# Patient Record
Sex: MALE | Race: WHITE | NOT HISPANIC OR LATINO | Employment: FULL TIME | URBAN - NONMETROPOLITAN AREA
[De-identification: names, ages, dates, MRNs, and addresses within clinical notes are randomized per-mention and may not be internally consistent; named-entity substitution may affect disease eponyms.]

---

## 2024-06-17 ENCOUNTER — APPOINTMENT (OUTPATIENT)
Dept: RADIOLOGY | Facility: HOSPITAL | Age: 59
End: 2024-06-17
Payer: COMMERCIAL

## 2024-06-17 ENCOUNTER — HOSPITAL ENCOUNTER (EMERGENCY)
Facility: HOSPITAL | Age: 59
Discharge: HOME | End: 2024-06-18
Attending: EMERGENCY MEDICINE
Payer: COMMERCIAL

## 2024-06-17 ENCOUNTER — APPOINTMENT (OUTPATIENT)
Dept: CARDIOLOGY | Facility: HOSPITAL | Age: 59
End: 2024-06-17
Payer: COMMERCIAL

## 2024-06-17 DIAGNOSIS — M79.10 MYALGIA: Primary | ICD-10-CM

## 2024-06-17 DIAGNOSIS — J06.9 UPPER RESPIRATORY TRACT INFECTION, UNSPECIFIED TYPE: ICD-10-CM

## 2024-06-17 DIAGNOSIS — J01.90 ACUTE SINUSITIS, RECURRENCE NOT SPECIFIED, UNSPECIFIED LOCATION: ICD-10-CM

## 2024-06-17 DIAGNOSIS — E87.6 HYPOKALEMIA: ICD-10-CM

## 2024-06-17 LAB
ALBUMIN SERPL BCP-MCNC: 3.9 G/DL (ref 3.4–5)
ALP SERPL-CCNC: 76 U/L (ref 33–136)
ALT SERPL W P-5'-P-CCNC: 19 U/L (ref 10–52)
ANION GAP SERPL CALC-SCNC: 12 MMOL/L (ref 10–20)
AST SERPL W P-5'-P-CCNC: 14 U/L (ref 9–39)
BASOPHILS # BLD AUTO: 0.08 X10*3/UL (ref 0–0.1)
BASOPHILS NFR BLD AUTO: 0.5 %
BILIRUB SERPL-MCNC: 0.4 MG/DL (ref 0–1.2)
BUN SERPL-MCNC: 24 MG/DL (ref 6–23)
CALCIUM SERPL-MCNC: 9 MG/DL (ref 8.6–10.3)
CARDIAC TROPONIN I PNL SERPL HS: <3 NG/L (ref 0–20)
CHLORIDE SERPL-SCNC: 104 MMOL/L (ref 98–107)
CO2 SERPL-SCNC: 26 MMOL/L (ref 21–32)
CREAT SERPL-MCNC: 1.37 MG/DL (ref 0.5–1.3)
D DIMER PPP FEU-MCNC: <215 NG/ML FEU
EGFRCR SERPLBLD CKD-EPI 2021: 59 ML/MIN/1.73M*2
EOSINOPHIL # BLD AUTO: 0.27 X10*3/UL (ref 0–0.7)
EOSINOPHIL NFR BLD AUTO: 1.5 %
ERYTHROCYTE [DISTWIDTH] IN BLOOD BY AUTOMATED COUNT: 13.2 % (ref 11.5–14.5)
GLUCOSE SERPL-MCNC: 96 MG/DL (ref 74–99)
HCT VFR BLD AUTO: 48.5 % (ref 41–52)
HGB BLD-MCNC: 16.7 G/DL (ref 13.5–17.5)
IMM GRANULOCYTES # BLD AUTO: 0.06 X10*3/UL (ref 0–0.7)
IMM GRANULOCYTES NFR BLD AUTO: 0.3 % (ref 0–0.9)
LIPASE SERPL-CCNC: 28 U/L (ref 9–82)
LYMPHOCYTES # BLD AUTO: 5.19 X10*3/UL (ref 1.2–4.8)
LYMPHOCYTES NFR BLD AUTO: 29.5 %
MAGNESIUM SERPL-MCNC: 1.91 MG/DL (ref 1.6–2.4)
MCH RBC QN AUTO: 31 PG (ref 26–34)
MCHC RBC AUTO-ENTMCNC: 34.4 G/DL (ref 32–36)
MCV RBC AUTO: 90 FL (ref 80–100)
MONOCYTES # BLD AUTO: 1.87 X10*3/UL (ref 0.1–1)
MONOCYTES NFR BLD AUTO: 10.6 %
NEUTROPHILS # BLD AUTO: 10.14 X10*3/UL (ref 1.2–7.7)
NEUTROPHILS NFR BLD AUTO: 57.6 %
NRBC BLD-RTO: 0 /100 WBCS (ref 0–0)
PLATELET # BLD AUTO: 240 X10*3/UL (ref 150–450)
POTASSIUM SERPL-SCNC: 3.2 MMOL/L (ref 3.5–5.3)
PROT SERPL-MCNC: 6.3 G/DL (ref 6.4–8.2)
RBC # BLD AUTO: 5.39 X10*6/UL (ref 4.5–5.9)
SARS-COV-2 RNA RESP QL NAA+PROBE: NOT DETECTED
SODIUM SERPL-SCNC: 139 MMOL/L (ref 136–145)
WBC # BLD AUTO: 17.6 X10*3/UL (ref 4.4–11.3)

## 2024-06-17 PROCEDURE — 96374 THER/PROPH/DIAG INJ IV PUSH: CPT

## 2024-06-17 PROCEDURE — 93005 ELECTROCARDIOGRAM TRACING: CPT

## 2024-06-17 PROCEDURE — 99285 EMERGENCY DEPT VISIT HI MDM: CPT | Mod: 25

## 2024-06-17 PROCEDURE — 84484 ASSAY OF TROPONIN QUANT: CPT | Performed by: EMERGENCY MEDICINE

## 2024-06-17 PROCEDURE — 87635 SARS-COV-2 COVID-19 AMP PRB: CPT | Performed by: EMERGENCY MEDICINE

## 2024-06-17 PROCEDURE — 85025 COMPLETE CBC W/AUTO DIFF WBC: CPT | Performed by: EMERGENCY MEDICINE

## 2024-06-17 PROCEDURE — 80053 COMPREHEN METABOLIC PANEL: CPT | Performed by: EMERGENCY MEDICINE

## 2024-06-17 PROCEDURE — 74177 CT ABD & PELVIS W/CONTRAST: CPT | Performed by: RADIOLOGY

## 2024-06-17 PROCEDURE — 36415 COLL VENOUS BLD VENIPUNCTURE: CPT | Performed by: EMERGENCY MEDICINE

## 2024-06-17 PROCEDURE — 2550000001 HC RX 255 CONTRASTS: Performed by: EMERGENCY MEDICINE

## 2024-06-17 PROCEDURE — 83735 ASSAY OF MAGNESIUM: CPT | Performed by: EMERGENCY MEDICINE

## 2024-06-17 PROCEDURE — 2500000002 HC RX 250 W HCPCS SELF ADMINISTERED DRUGS (ALT 637 FOR MEDICARE OP, ALT 636 FOR OP/ED): Performed by: EMERGENCY MEDICINE

## 2024-06-17 PROCEDURE — 85379 FIBRIN DEGRADATION QUANT: CPT | Performed by: EMERGENCY MEDICINE

## 2024-06-17 PROCEDURE — 83690 ASSAY OF LIPASE: CPT | Performed by: EMERGENCY MEDICINE

## 2024-06-17 PROCEDURE — 74177 CT ABD & PELVIS W/CONTRAST: CPT

## 2024-06-17 PROCEDURE — 71260 CT THORAX DX C+: CPT | Performed by: RADIOLOGY

## 2024-06-17 PROCEDURE — 2500000004 HC RX 250 GENERAL PHARMACY W/ HCPCS (ALT 636 FOR OP/ED): Performed by: EMERGENCY MEDICINE

## 2024-06-17 PROCEDURE — 2500000001 HC RX 250 WO HCPCS SELF ADMINISTERED DRUGS (ALT 637 FOR MEDICARE OP): Performed by: EMERGENCY MEDICINE

## 2024-06-17 RX ORDER — POTASSIUM CHLORIDE 20 MEQ/1
20 TABLET, EXTENDED RELEASE ORAL 2 TIMES DAILY
Qty: 10 TABLET | Refills: 0 | Status: SHIPPED | OUTPATIENT
Start: 2024-06-17 | End: 2024-06-22

## 2024-06-17 RX ORDER — POTASSIUM CHLORIDE 20 MEQ/1
40 TABLET, EXTENDED RELEASE ORAL ONCE
Status: COMPLETED | OUTPATIENT
Start: 2024-06-17 | End: 2024-06-17

## 2024-06-17 RX ORDER — DOXYCYCLINE HYCLATE 50 MG/1
100 CAPSULE ORAL ONCE
Status: COMPLETED | OUTPATIENT
Start: 2024-06-17 | End: 2024-06-17

## 2024-06-17 RX ORDER — DOXYCYCLINE 100 MG/1
100 TABLET ORAL 2 TIMES DAILY
Qty: 20 TABLET | Refills: 0 | Status: SHIPPED | OUTPATIENT
Start: 2024-06-17 | End: 2024-06-27

## 2024-06-17 RX ORDER — KETOROLAC TROMETHAMINE 15 MG/ML
15 INJECTION, SOLUTION INTRAMUSCULAR; INTRAVENOUS ONCE
Status: COMPLETED | OUTPATIENT
Start: 2024-06-17 | End: 2024-06-17

## 2024-06-17 ASSESSMENT — PAIN - FUNCTIONAL ASSESSMENT: PAIN_FUNCTIONAL_ASSESSMENT: 0-10

## 2024-06-17 ASSESSMENT — PAIN SCALES - GENERAL
PAINLEVEL_OUTOF10: 6
PAINLEVEL_OUTOF10: 0 - NO PAIN

## 2024-06-18 VITALS
HEART RATE: 84 BPM | HEIGHT: 69 IN | OXYGEN SATURATION: 97 % | TEMPERATURE: 98 F | DIASTOLIC BLOOD PRESSURE: 57 MMHG | WEIGHT: 155 LBS | SYSTOLIC BLOOD PRESSURE: 99 MMHG | BODY MASS INDEX: 22.96 KG/M2 | RESPIRATION RATE: 17 BRPM

## 2024-06-18 ASSESSMENT — PAIN - FUNCTIONAL ASSESSMENT: PAIN_FUNCTIONAL_ASSESSMENT: 0-10

## 2024-06-18 ASSESSMENT — PAIN SCALES - GENERAL: PAINLEVEL_OUTOF10: 0 - NO PAIN

## 2024-06-18 NOTE — PROGRESS NOTES
Emergency Medicine Transition of Care Note.    I received Brennen Reyes in signout from Dr. KENDALL Sherwood.  Please see the previous ED provider note for all HPI, PE and MDM up to the time of signout at 2300. This is in addition to the primary record.    In brief Brennen Reyes is an 59 y.o. male presenting for   Chief Complaint   Patient presents with    Nasal Congestion     Cough , congestion , general body aches      At the time of signout we were awaiting: CT imaging chest/abd/pelvis    See  Dr Sherwood provider note:  HPI  Patient is a 59-year-old male presenting to the ED today for generalized bodyaches, cough, congestion, shortness of breath, abdominal pain, diarrhea.  Patient states that his symptoms have been ongoing for 1 month.  He does have a history of IBS, so the diarrhea is not unusual for him.  He went to urgent care last week and was prescribed a Z-Mihir and prednisone.  He states that his symptoms improved significantly on these medications, but he finished them and his symptoms returned.  He does not currently have a PCP as his insurance recently switched from [facility] to .  He denies any fevers at home.  He does not normally wear supplemental oxygen.  He has no additional concerns.    Medical Decision Making  Patient was seen and evaluated for 1 month of generalized muscle aching, cough, congestion, shortness of breath, abdominal pain, diarrhea.  On arrival, vital signs are stable.  Patient is oxygenating well on room air.  Peripheral IV is established and patient is started on a 1 L bolus of normal saline.  He is administered Toradol 15 mg IV for his pain.  Additional labs and imaging are ordered for further evaluation of his symptoms.     CBC shows leukocytosis with WBC of 17.6.  However, patient just recently finished his course of prednisone, which could be contributing to his leukocytosis.  CBC is otherwise unremarkable.  CMP shows mild hypokalemia with potassium of 3.2.  This is replaced with 40 mill  equivalents KCl orally.  Creatinine is slightly elevated at 1.37.  I have no previous lab work to compare to.  D-dimer is negative.  High-sensitivity troponin is negative.  Magnesium is normal at 1.9.  Lipase is normal at 28.     At this time, CT imaging remains pending.  Patient will be signed out to the oncoming physician, Dr. Diaz, pending the results of his imaging and further disposition.      ED Course as of 06/17/24 2344   Mon Jun 17, 2024 2215 EKG obtained at 2210, interpreted by myself.  Normal sinus rhythm with a ventricular rate of 65, no axis deviation, normal intervals, with no acute ischemic changes [VT]   2255 LEUKOCYTES (10*3/UL) IN BLOOD BY AUTOMATED COUNT, SHIELA(!): 17.6 [EC]   2326 Troponin is normal at less than 3  D-dimer is normal less than 215  Coronavirus is not detected  Magnesium normal at 1.91    Low potassium 3.2 was previously treated with oral KCl 40 mEq [EC]   2328 White count 17.6, hemoglobin 16.7 hematocrit 40.5, platelet count of 40,000, neutrophils 10.14  Patient recently completed prednisone  [EC]   2331 CT of the chest abdomen and pelvis  FINDINGS:  CHEST:      No pneumothorax or pleural effusion. Slight bronchial thickening. No  localizing infiltrate.      No pericardial effusion. No adenopathy ascending aorta measures 3.2  cm.      No central airway lesion.      Small sliding hiatal hernia no suspicious osseous lesions or acute  osseous abnormality.      ABDOMEN:      Congenital malrotation is noted without obstruction. Slightly  thick-walled appearance of the stomach. This may reflect mild  gastritis. Mucosal lesions not excluded by imaging.      Mild hepatic steatosis. Unremarkable adrenal glands pancreas and  spleen. 1 cm benign-appearing left renal cyst identified.      Moderate vascular calcification. There is no aneurysm.      The appendix has been removed. Fairly profound diverticulosis without  diverticulitis. Correlation with any known colonoscopy results.  No  free air. No free fluid.      Prostate gland is mildly enlarged. It deforms the bladder base. No  suspicious osseous lesions.      IMPRESSION:  No acute cardiopulmonary process detected. No evidence of pneumonia      Congenital malrotation without obstruction. Slightly thick-walled  appearance of the stomach which may reflect some mild gastritis  versus poor distention.      Appendectomy changes. Moderate constipation. There is fairly profound  diverticulosis of the sigmoid without diverticulitis. Correlation  with any known colonoscopy results     [EC]      ED Course User Index  [EC] Jacob Diaz DO  [VT] Heavenly ARGUETA MD         Diagnoses as of 06/17/24 2344   Myalgia   Hypokalemia   Upper respiratory tract infection, unspecified type   Acute sinusitis, recurrence not specified, unspecified location       Medical Decision Making  On reevaluation patient is resting comfortably.  Patient was discharged home in improved and stable condition.  I will start the patient on doxycycline 100 mg twice daily x 10 days for URI/sinusitis  Patient is encouraged to take Tylenol as needed for fever aches and pain.  Rx KCl 20 mEq 1 p.o. twice daily x 5 days for hypokalemia.  Patient given referral to follow-up with primary care and was given a provider list to choose a primary care provider locally.  Patient is invited to return if acutely worsening or worrisome symptoms.  Written and verbal instructions were given patient verbalized understanding of these.        Final diagnoses:   [M79.10] Myalgia   [E87.6] Hypokalemia   [J06.9] Upper respiratory tract infection, unspecified type   [J01.90] Acute sinusitis, recurrence not specified, unspecified location           Procedure  Procedures    Jacob Diaz DO

## 2024-06-18 NOTE — ED PROVIDER NOTES
HPI   Chief Complaint   Patient presents with    Nasal Congestion     Cough , congestion , general body aches        HPI  Patient is a 59-year-old male presenting to the ED today for generalized bodyaches, cough, congestion, shortness of breath, abdominal pain, diarrhea.  Patient states that his symptoms have been ongoing for 1 month.  He does have a history of IBS, so the diarrhea is not unusual for him.  He went to urgent care last week and was prescribed a Z-Mihir and prednisone.  He states that his symptoms improved significantly on these medications, but he finished them and his symptoms returned.  He does not currently have a PCP as his insurance recently switched from [facility] to .  He denies any fevers at home.  He does not normally wear supplemental oxygen.  He has no additional concerns.                  No data recorded                   Patient History   Past Medical History:   Diagnosis Date    BPH with urinary obstruction     Gastroesophageal reflux disease with esophagitis without hemorrhage     Hyperlipidemia     Irritable bowel syndrome with diarrhea      History reviewed. No pertinent surgical history.  No family history on file.  Social History     Tobacco Use    Smoking status: Not on file    Smokeless tobacco: Not on file   Substance Use Topics    Alcohol use: Not on file    Drug use: Not on file       Physical Exam   ED Triage Vitals [06/17/24 2129]   Temperature Heart Rate Respirations BP   36.7 °C (98 °F) 82 16 105/62      Pulse Ox Temp Source Heart Rate Source Patient Position   97 % Tympanic -- Sitting      BP Location FiO2 (%)     Left arm --       Physical Exam  Vitals and nursing note reviewed.   Constitutional:       General: He is not in acute distress.     Appearance: He is not toxic-appearing.   HENT:      Head: Normocephalic.      Mouth/Throat:      Mouth: Mucous membranes are moist.   Eyes:      Extraocular Movements: Extraocular movements intact.      Conjunctiva/sclera: Conjunctivae  normal.   Cardiovascular:      Rate and Rhythm: Normal rate and regular rhythm.      Pulses: Normal pulses.   Pulmonary:      Effort: Pulmonary effort is normal. No respiratory distress.      Breath sounds: Normal breath sounds. No wheezing.   Abdominal:      General: There is no distension.      Palpations: Abdomen is soft.      Tenderness: There is no abdominal tenderness.   Musculoskeletal:         General: No swelling.      Cervical back: Neck supple.   Skin:     General: Skin is warm and dry.      Capillary Refill: Capillary refill takes less than 2 seconds.   Neurological:      General: No focal deficit present.      Mental Status: He is alert. Mental status is at baseline.         ED Course & MDM   ED Course as of 06/17/24 2243 Mon Jun 17, 2024 2215 EKG obtained at 2210, interpreted by myself.  Normal sinus rhythm with a ventricular rate of 65, no axis deviation, normal intervals, with no acute ischemic changes [VT]      ED Course User Index  [VT] Heavenly ARGUETA MD         Diagnoses as of 06/17/24 2243   Myalgia   Hypokalemia       Medical Decision Making  Patient was seen and evaluated for 1 month of generalized muscle aching, cough, congestion, shortness of breath, abdominal pain, diarrhea.  On arrival, vital signs are stable.  Patient is oxygenating well on room air.  Peripheral IV is established and patient is started on a 1 L bolus of normal saline.  He is administered Toradol 15 mg IV for his pain.  Additional labs and imaging are ordered for further evaluation of his symptoms.    CBC shows leukocytosis with WBC of 17.6.  However, patient just recently finished his course of prednisone, which could be contributing to his leukocytosis.  CBC is otherwise unremarkable.  CMP shows mild hypokalemia with potassium of 3.2.  This is replaced with 40 mill equivalents KCl orally.  Creatinine is slightly elevated at 1.37.  I have no previous lab work to compare to.  D-dimer is negative.  High-sensitivity  troponin is negative.  Magnesium is normal at 1.9.  Lipase is normal at 28.    At this time, CT imaging remains pending.  Patient will be signed out to the oncoming physician, Dr. Diaz, pending the results of his imaging and further disposition.    Procedure  Procedures     Heavenly ARGUEAT MD  06/17/24 1957

## 2024-06-18 NOTE — DISCHARGE INSTRUCTIONS
Take medications only as prescribed  You may take tylenol for pain as needed.  Push clear fluids advance diet as tolerated.  Follow-up with primary care in 3 to 5 days  Return if acutely worsening or worrisome symptoms.

## 2024-06-20 LAB
ATRIAL RATE: 65 BPM
P AXIS: 65 DEGREES
P OFFSET: 214 MS
P ONSET: 151 MS
PR INTERVAL: 146 MS
Q ONSET: 224 MS
QRS COUNT: 10 BEATS
QRS DURATION: 100 MS
QT INTERVAL: 392 MS
QTC CALCULATION(BAZETT): 407 MS
QTC FREDERICIA: 402 MS
R AXIS: 20 DEGREES
T AXIS: 35 DEGREES
T OFFSET: 420 MS
VENTRICULAR RATE: 65 BPM

## 2024-07-01 ENCOUNTER — HOSPITAL ENCOUNTER (OUTPATIENT)
Dept: RADIOLOGY | Facility: HOSPITAL | Age: 59
Discharge: HOME | End: 2024-07-01
Payer: COMMERCIAL

## 2024-07-01 ENCOUNTER — APPOINTMENT (OUTPATIENT)
Dept: PRIMARY CARE | Facility: CLINIC | Age: 59
End: 2024-07-01
Payer: COMMERCIAL

## 2024-07-01 VITALS
DIASTOLIC BLOOD PRESSURE: 73 MMHG | BODY MASS INDEX: 23.4 KG/M2 | HEIGHT: 69 IN | WEIGHT: 158 LBS | RESPIRATION RATE: 18 BRPM | OXYGEN SATURATION: 97 % | SYSTOLIC BLOOD PRESSURE: 121 MMHG | HEART RATE: 80 BPM

## 2024-07-01 DIAGNOSIS — Z00.00 HEALTHCARE MAINTENANCE: ICD-10-CM

## 2024-07-01 DIAGNOSIS — R35.0 URINARY FREQUENCY: ICD-10-CM

## 2024-07-01 DIAGNOSIS — K06.9 GUM DISEASE: ICD-10-CM

## 2024-07-01 DIAGNOSIS — M54.41 CHRONIC RIGHT-SIDED LOW BACK PAIN WITH RIGHT-SIDED SCIATICA: ICD-10-CM

## 2024-07-01 DIAGNOSIS — R35.0 URINARY FREQUENCY: Primary | ICD-10-CM

## 2024-07-01 DIAGNOSIS — R10.12 LUQ ABDOMINAL PAIN: ICD-10-CM

## 2024-07-01 DIAGNOSIS — G89.29 CHRONIC RIGHT-SIDED LOW BACK PAIN WITH RIGHT-SIDED SCIATICA: ICD-10-CM

## 2024-07-01 DIAGNOSIS — K57.90 DIVERTICULOSIS: ICD-10-CM

## 2024-07-01 DIAGNOSIS — R93.5 ABNORMAL ABDOMINAL ULTRASOUND: ICD-10-CM

## 2024-07-01 DIAGNOSIS — G47.09 OTHER INSOMNIA: ICD-10-CM

## 2024-07-01 PROBLEM — M79.10 MYALGIA: Status: ACTIVE | Noted: 2023-02-15

## 2024-07-01 PROBLEM — K21.00 GASTROESOPHAGEAL REFLUX DISEASE WITH ESOPHAGITIS WITHOUT HEMORRHAGE: Status: ACTIVE | Noted: 2021-08-26

## 2024-07-01 PROBLEM — R79.89 ELEVATED SERUM CREATININE: Status: ACTIVE | Noted: 2019-04-03

## 2024-07-01 PROBLEM — E78.2 HYPERLIPIDEMIA, MIXED: Status: ACTIVE | Noted: 2023-02-13

## 2024-07-01 PROBLEM — E87.1 HYPONATREMIA: Status: ACTIVE | Noted: 2023-03-20

## 2024-07-01 PROBLEM — G56.02 CARPAL TUNNEL SYNDROME, LEFT: Status: ACTIVE | Noted: 2023-03-20

## 2024-07-01 PROBLEM — K58.0 IRRITABLE BOWEL SYNDROME WITH DIARRHEA: Status: ACTIVE | Noted: 2019-04-03

## 2024-07-01 PROBLEM — Z91.199 NONCOMPLIANCE: Status: ACTIVE | Noted: 2023-02-13

## 2024-07-01 PROBLEM — D75.1 POLYCYTHEMIA: Status: ACTIVE | Noted: 2023-02-20

## 2024-07-01 LAB
POC APPEARANCE, URINE: CLEAR
POC BILIRUBIN, URINE: NEGATIVE
POC BLOOD, URINE: ABNORMAL
POC COLOR, URINE: YELLOW
POC GLUCOSE, URINE: ABNORMAL MG/DL
POC KETONES, URINE: NEGATIVE MG/DL
POC LEUKOCYTES, URINE: NEGATIVE
POC NITRITE,URINE: NEGATIVE
POC PH, URINE: 6 PH
POC PROTEIN, URINE: NEGATIVE MG/DL
POC SPECIFIC GRAVITY, URINE: 1.02
POC UROBILINOGEN, URINE: 0.2 EU/DL

## 2024-07-01 PROCEDURE — 4004F PT TOBACCO SCREEN RCVD TLK: CPT

## 2024-07-01 PROCEDURE — 76705 ECHO EXAM OF ABDOMEN: CPT | Performed by: RADIOLOGY

## 2024-07-01 PROCEDURE — 81003 URINALYSIS AUTO W/O SCOPE: CPT

## 2024-07-01 PROCEDURE — 99203 OFFICE O/P NEW LOW 30 MIN: CPT

## 2024-07-01 PROCEDURE — 76700 US EXAM ABDOM COMPLETE: CPT

## 2024-07-01 ASSESSMENT — PATIENT HEALTH QUESTIONNAIRE - PHQ9
3. TROUBLE FALLING OR STAYING ASLEEP OR SLEEPING TOO MUCH: NEARLY EVERY DAY
5. POOR APPETITE OR OVEREATING: NOT AT ALL
4. FEELING TIRED OR HAVING LITTLE ENERGY: NEARLY EVERY DAY
8. MOVING OR SPEAKING SO SLOWLY THAT OTHER PEOPLE COULD HAVE NOTICED. OR THE OPPOSITE, BEING SO FIGETY OR RESTLESS THAT YOU HAVE BEEN MOVING AROUND A LOT MORE THAN USUAL: NEARLY EVERY DAY
1. LITTLE INTEREST OR PLEASURE IN DOING THINGS: NEARLY EVERY DAY
9. THOUGHTS THAT YOU WOULD BE BETTER OFF DEAD, OR OF HURTING YOURSELF: MORE THAN HALF THE DAYS
2. FEELING DOWN, DEPRESSED OR HOPELESS: NEARLY EVERY DAY
10. IF YOU CHECKED OFF ANY PROBLEMS, HOW DIFFICULT HAVE THESE PROBLEMS MADE IT FOR YOU TO DO YOUR WORK, TAKE CARE OF THINGS AT HOME, OR GET ALONG WITH OTHER PEOPLE: EXTREMELY DIFFICULT
7. TROUBLE CONCENTRATING ON THINGS, SUCH AS READING THE NEWSPAPER OR WATCHING TELEVISION: NEARLY EVERY DAY
SUM OF ALL RESPONSES TO PHQ QUESTIONS 1-9: 23
SUM OF ALL RESPONSES TO PHQ9 QUESTIONS 1 AND 2: 6
6. FEELING BAD ABOUT YOURSELF - OR THAT YOU ARE A FAILURE OR HAVE LET YOURSELF OR YOUR FAMILY DOWN: NEARLY EVERY DAY

## 2024-07-01 ASSESSMENT — ENCOUNTER SYMPTOMS
DIZZINESS: 0
FLANK PAIN: 0
FATIGUE: 0
SHORTNESS OF BREATH: 0
NAUSEA: 0
FREQUENCY: 1
BLOOD IN STOOL: 0
CONSTIPATION: 0
FEVER: 0
BACK PAIN: 1
CHILLS: 0
ABDOMINAL PAIN: 1
VOMITING: 0
DYSURIA: 0
HEMATURIA: 0
HEADACHES: 0
DIARRHEA: 1

## 2024-07-01 ASSESSMENT — PAIN SCALES - GENERAL: PAINLEVEL: 5

## 2024-07-01 NOTE — PROGRESS NOTES
Subjective   Patient ID: Brennen Reyes is a 59 y.o. male who presents for New Patient Visit and Follow-up (ER 6/17/24-6/18/24).    HPI   59 y.o. male with PMH remarkable for fitch's esophagus, IBS-D, diverticulosis, HLD here today to establish care and follow up from ER.     C/o:  - Back and leg pain  - Insomnia - from constant pain - sleeps 2 hours at a time  Taking ibuprofen for pain    - Gum disease - July 17th appointment for tooth extraction    Nutrition - chicken, fish, carrots, green beans, PBJ  Drinks water or gatorade, no alcohol, no drugs     - IBS-D - since 27 y.o.     - Urinary frequency - felt like had to go every hour, has had history of prostatitis, has seen urology and has had many tests     No trauma or falls     ER HPI 6/17/24:  Patient is a 59-year-old male presenting to the ED today for generalized bodyaches, cough, congestion, shortness of breath, abdominal pain, diarrhea.  Patient states that his symptoms have been ongoing for 1 month.  He does have a history of IBS, so the diarrhea is not unusual for him.  He went to urgent care last week and was prescribed a Z-Mihir and prednisone.  He states that his symptoms improved significantly on these medications, but he finished them and his symptoms returned.  He does not currently have a PCP as his insurance recently switched from [facility] to .  He denies any fevers at home.  He does not normally wear supplemental oxygen.  He has no additional concerns.    Medical Decision Making  On reevaluation patient is resting comfortably.  Patient was discharged home in improved and stable condition.  I will start the patient on doxycycline 100 mg twice daily x 10 days for URI/sinusitis  Patient is encouraged to take Tylenol as needed for fever aches and pain.  Rx KCl 20 mEq 1 p.o. twice daily x 5 days for hypokalemia.  Patient given referral to follow-up with primary care and was given a provider list to choose a primary care provider locally.  Patient is  "invited to return if acutely worsening or worrisome symptoms.        Review of Systems   Constitutional:  Negative for chills, fatigue and fever.   Respiratory:  Negative for shortness of breath.    Cardiovascular:  Negative for chest pain.   Gastrointestinal:  Positive for abdominal pain and diarrhea. Negative for blood in stool, constipation, nausea and vomiting.   Genitourinary:  Positive for frequency. Negative for dysuria, flank pain, hematuria, penile discharge, penile pain and testicular pain.   Musculoskeletal:  Positive for back pain.   Neurological:  Negative for dizziness and headaches.   All other systems reviewed and are negative.      Objective   /73   Pulse 80   Resp 18   Ht 1.753 m (5' 9\")   Wt 71.7 kg (158 lb)   SpO2 97%   BMI 23.33 kg/m²     Physical Exam  Vitals reviewed.   Constitutional:       Appearance: Normal appearance.   HENT:      Head: Normocephalic and atraumatic.   Eyes:      Extraocular Movements: Extraocular movements intact.      Conjunctiva/sclera: Conjunctivae normal.   Neck:      Vascular: No carotid bruit.   Cardiovascular:      Rate and Rhythm: Normal rate and regular rhythm.      Pulses: Normal pulses.      Heart sounds: Normal heart sounds.   Pulmonary:      Effort: Pulmonary effort is normal.      Breath sounds: Normal breath sounds. No wheezing, rhonchi or rales.   Abdominal:      General: Bowel sounds are increased.      Palpations: Abdomen is soft. There is no mass.      Tenderness: There is abdominal tenderness in the left upper quadrant and left lower quadrant. There is no right CVA tenderness, left CVA tenderness, guarding or rebound. Negative signs include Mcdermott's sign and McBurney's sign.   Musculoskeletal:      Cervical back: Normal range of motion and neck supple.   Neurological:      General: No focal deficit present.      Mental Status: He is alert and oriented to person, place, and time.   Psychiatric:         Mood and Affect: Mood normal.         " Behavior: Behavior normal.         Thought Content: Thought content normal.         Judgment: Judgment normal.         Assessment/Plan   Problem List Items Addressed This Visit             ICD-10-CM    Chronic right-sided low back pain with right-sided sciatica M54.41, G89.29    Urinary frequency - Primary R35.0    Relevant Orders    POCT UA Automated manually resulted (Completed)    US abdomen complete (Completed)    Gum disease K06.9    Other insomnia G47.09    LUQ abdominal pain R10.12    Relevant Orders    US abdomen complete (Completed)    Diverticulosis K57.90     Other Visit Diagnoses         Codes    Healthcare maintenance     Z00.00    Relevant Orders    TSH with reflex to Free T4 if abnormal    Hemoglobin A1C    CBC and Auto Differential    Lipid Panel    Comprehensive Metabolic Panel    Vitamin D 25-Hydroxy,Total (for eval of Vitamin D levels)    Vitamin B12    Prostate Spec.Ag,Screen          - Encounter to establish care.  - Recommend patient to complete blood work and abdominal ultrasound. Will consider additional treatment or further imaging pending results. Recent abdominal CT was unremarkable.   - Consideration for gum disease contributing to patient's symptoms.  - Will follow up with results.   - Risks and warning signs discussed. Patient understands seeking care at ER with worsening symptoms.  - Follow up in 4 months.

## 2024-07-01 NOTE — LETTER
July 1, 2024     Patient: Brennen Reyes   YOB: 1965   Date of Visit: 7/1/2024       To Whom It May Concern:    Brennen Reyes was seen in my clinic on 7/1/2024 at 11:00 am.  And was sent to the hospital for further testing on 7/1/24.  Please excuse Brennen for his absence from work on this day to make the appointment.    If you have any questions or concerns, please don't hesitate to call.         Sincerely,         Linnette Steward PA-C        CC: No Recipients

## 2024-07-02 ENCOUNTER — LAB (OUTPATIENT)
Dept: LAB | Facility: LAB | Age: 59
End: 2024-07-02
Payer: COMMERCIAL

## 2024-07-02 DIAGNOSIS — Z00.00 HEALTHCARE MAINTENANCE: ICD-10-CM

## 2024-07-02 LAB
25(OH)D3 SERPL-MCNC: 24 NG/ML (ref 30–100)
ALBUMIN SERPL BCP-MCNC: 4 G/DL (ref 3.4–5)
ALP SERPL-CCNC: 83 U/L (ref 33–136)
ALT SERPL W P-5'-P-CCNC: 20 U/L (ref 10–52)
ANION GAP SERPL CALC-SCNC: 10 MMOL/L (ref 10–20)
AST SERPL W P-5'-P-CCNC: 18 U/L (ref 9–39)
BASOPHILS # BLD AUTO: 0.12 X10*3/UL (ref 0–0.1)
BASOPHILS NFR BLD AUTO: 1.1 %
BILIRUB SERPL-MCNC: 0.6 MG/DL (ref 0–1.2)
BUN SERPL-MCNC: 14 MG/DL (ref 6–23)
CALCIUM SERPL-MCNC: 8.9 MG/DL (ref 8.6–10.3)
CHLORIDE SERPL-SCNC: 103 MMOL/L (ref 98–107)
CHOLEST SERPL-MCNC: 180 MG/DL (ref 0–199)
CHOLESTEROL/HDL RATIO: 3.6
CO2 SERPL-SCNC: 27 MMOL/L (ref 21–32)
CREAT SERPL-MCNC: 1.26 MG/DL (ref 0.5–1.3)
EGFRCR SERPLBLD CKD-EPI 2021: 65 ML/MIN/1.73M*2
EOSINOPHIL # BLD AUTO: 0.35 X10*3/UL (ref 0–0.7)
EOSINOPHIL NFR BLD AUTO: 3.3 %
ERYTHROCYTE [DISTWIDTH] IN BLOOD BY AUTOMATED COUNT: 13.5 % (ref 11.5–14.5)
EST. AVERAGE GLUCOSE BLD GHB EST-MCNC: 108 MG/DL
GLUCOSE SERPL-MCNC: 98 MG/DL (ref 74–99)
HBA1C MFR BLD: 5.4 %
HCT VFR BLD AUTO: 51.5 % (ref 41–52)
HDLC SERPL-MCNC: 50.6 MG/DL
HGB BLD-MCNC: 17.4 G/DL (ref 13.5–17.5)
IMM GRANULOCYTES # BLD AUTO: 0.03 X10*3/UL (ref 0–0.7)
IMM GRANULOCYTES NFR BLD AUTO: 0.3 % (ref 0–0.9)
LDLC SERPL CALC-MCNC: 117 MG/DL
LYMPHOCYTES # BLD AUTO: 2.58 X10*3/UL (ref 1.2–4.8)
LYMPHOCYTES NFR BLD AUTO: 24.3 %
MCH RBC QN AUTO: 31.1 PG (ref 26–34)
MCHC RBC AUTO-ENTMCNC: 33.8 G/DL (ref 32–36)
MCV RBC AUTO: 92 FL (ref 80–100)
MONOCYTES # BLD AUTO: 1 X10*3/UL (ref 0.1–1)
MONOCYTES NFR BLD AUTO: 9.4 %
NEUTROPHILS # BLD AUTO: 6.55 X10*3/UL (ref 1.2–7.7)
NEUTROPHILS NFR BLD AUTO: 61.6 %
NON HDL CHOLESTEROL: 129 MG/DL (ref 0–149)
NRBC BLD-RTO: 0 /100 WBCS (ref 0–0)
PLATELET # BLD AUTO: 243 X10*3/UL (ref 150–450)
POTASSIUM SERPL-SCNC: 4.4 MMOL/L (ref 3.5–5.3)
PROT SERPL-MCNC: 6.8 G/DL (ref 6.4–8.2)
PSA SERPL-MCNC: 1.7 NG/ML
RBC # BLD AUTO: 5.6 X10*6/UL (ref 4.5–5.9)
SODIUM SERPL-SCNC: 136 MMOL/L (ref 136–145)
TRIGL SERPL-MCNC: 61 MG/DL (ref 0–149)
TSH SERPL-ACNC: 1.89 MIU/L (ref 0.44–3.98)
VIT B12 SERPL-MCNC: 249 PG/ML (ref 211–911)
VLDL: 12 MG/DL (ref 0–40)
WBC # BLD AUTO: 10.6 X10*3/UL (ref 4.4–11.3)

## 2024-07-02 PROCEDURE — 84153 ASSAY OF PSA TOTAL: CPT

## 2024-07-02 PROCEDURE — 83036 HEMOGLOBIN GLYCOSYLATED A1C: CPT

## 2024-07-02 PROCEDURE — 82306 VITAMIN D 25 HYDROXY: CPT

## 2024-07-02 PROCEDURE — 82607 VITAMIN B-12: CPT

## 2024-07-02 PROCEDURE — 36415 COLL VENOUS BLD VENIPUNCTURE: CPT

## 2024-07-05 ENCOUNTER — CLINICAL SUPPORT (OUTPATIENT)
Dept: PRIMARY CARE | Facility: CLINIC | Age: 59
End: 2024-07-05
Payer: COMMERCIAL

## 2024-07-05 DIAGNOSIS — E53.8 VITAMIN B12 DEFICIENCY: ICD-10-CM

## 2024-07-05 DIAGNOSIS — R79.89 LOW VITAMIN D LEVEL: Primary | ICD-10-CM

## 2024-07-05 DIAGNOSIS — R79.89 LOW VITAMIN B12 LEVEL: ICD-10-CM

## 2024-07-05 PROCEDURE — 96372 THER/PROPH/DIAG INJ SC/IM: CPT

## 2024-07-05 RX ORDER — CYANOCOBALAMIN 1000 UG/ML
1000 INJECTION, SOLUTION INTRAMUSCULAR; SUBCUTANEOUS ONCE
Status: COMPLETED | OUTPATIENT
Start: 2024-07-05 | End: 2024-07-05

## 2024-07-05 RX ORDER — ERGOCALCIFEROL 1.25 MG/1
50000 CAPSULE ORAL
Qty: 12 CAPSULE | Refills: 0 | Status: SHIPPED | OUTPATIENT
Start: 2024-07-07 | End: 2024-09-29

## 2024-07-12 ENCOUNTER — APPOINTMENT (OUTPATIENT)
Dept: PRIMARY CARE | Facility: CLINIC | Age: 59
End: 2024-07-12
Payer: COMMERCIAL

## 2024-07-12 DIAGNOSIS — E53.8 VITAMIN B12 DEFICIENCY: ICD-10-CM

## 2024-07-12 PROCEDURE — 96372 THER/PROPH/DIAG INJ SC/IM: CPT | Performed by: INTERNAL MEDICINE

## 2024-07-12 RX ORDER — CYANOCOBALAMIN 1000 UG/ML
1000 INJECTION, SOLUTION INTRAMUSCULAR; SUBCUTANEOUS ONCE
Status: COMPLETED | OUTPATIENT
Start: 2024-07-12 | End: 2024-07-12

## 2024-07-19 ENCOUNTER — CLINICAL SUPPORT (OUTPATIENT)
Dept: PRIMARY CARE | Facility: CLINIC | Age: 59
End: 2024-07-19
Payer: COMMERCIAL

## 2024-07-19 DIAGNOSIS — E53.8 VITAMIN B12 DEFICIENCY: ICD-10-CM

## 2024-07-19 PROCEDURE — 96372 THER/PROPH/DIAG INJ SC/IM: CPT | Performed by: INTERNAL MEDICINE

## 2024-07-19 RX ORDER — CYANOCOBALAMIN 1000 UG/ML
1000 INJECTION, SOLUTION INTRAMUSCULAR; SUBCUTANEOUS ONCE
Status: COMPLETED | OUTPATIENT
Start: 2024-07-19 | End: 2024-07-19

## 2024-08-09 ENCOUNTER — OFFICE VISIT (OUTPATIENT)
Dept: VASCULAR SURGERY | Facility: CLINIC | Age: 59
End: 2024-08-09
Payer: COMMERCIAL

## 2024-08-09 VITALS
HEIGHT: 69 IN | HEART RATE: 78 BPM | BODY MASS INDEX: 22.96 KG/M2 | DIASTOLIC BLOOD PRESSURE: 71 MMHG | WEIGHT: 155 LBS | SYSTOLIC BLOOD PRESSURE: 114 MMHG

## 2024-08-09 DIAGNOSIS — R93.5 ABNORMAL ABDOMINAL ULTRASOUND: ICD-10-CM

## 2024-08-09 DIAGNOSIS — I77.819 AORTIC ECTASIA (CMS-HCC): Primary | ICD-10-CM

## 2024-08-09 PROCEDURE — 99214 OFFICE O/P EST MOD 30 MIN: CPT | Performed by: PHYSICIAN ASSISTANT

## 2024-08-09 PROCEDURE — 4004F PT TOBACCO SCREEN RCVD TLK: CPT | Performed by: PHYSICIAN ASSISTANT

## 2024-08-09 PROCEDURE — 3008F BODY MASS INDEX DOCD: CPT | Performed by: PHYSICIAN ASSISTANT

## 2024-08-09 PROCEDURE — 99204 OFFICE O/P NEW MOD 45 MIN: CPT | Performed by: PHYSICIAN ASSISTANT

## 2024-08-09 ASSESSMENT — PATIENT HEALTH QUESTIONNAIRE - PHQ9
2. FEELING DOWN, DEPRESSED OR HOPELESS: SEVERAL DAYS
1. LITTLE INTEREST OR PLEASURE IN DOING THINGS: NOT AT ALL
SUM OF ALL RESPONSES TO PHQ9 QUESTIONS 1 AND 2: 1

## 2024-08-09 ASSESSMENT — ENCOUNTER SYMPTOMS
COUGH: 0
ABDOMINAL PAIN: 1
PALPITATIONS: 0
ARTHRALGIAS: 0
CHILLS: 0
NAUSEA: 0
WEAKNESS: 0
LIGHT-HEADEDNESS: 0
WHEEZING: 0
SEIZURES: 0
SPEECH DIFFICULTY: 0
SORE THROAT: 0
FEVER: 0
NECK PAIN: 0
NUMBNESS: 0
JOINT SWELLING: 0
CONSTIPATION: 0
DIZZINESS: 0
DEPRESSION: 0
SHORTNESS OF BREATH: 0
HEADACHES: 0
SLEEP DISTURBANCE: 0
TROUBLE SWALLOWING: 0
FATIGUE: 1
DIFFICULTY URINATING: 0
FACIAL ASYMMETRY: 0
WOUND: 0
DIARRHEA: 0
CONFUSION: 0
VOMITING: 0
COLOR CHANGE: 0
ADENOPATHY: 0

## 2024-08-09 NOTE — PROGRESS NOTES
Subjective   Patient ID: Brennen Reyes is a 59 y.o. male who presents for New Patient Visit and Aortic Aneurysm.  HPI  59 year old male with past medical history of insomnia, fitch's esophagus, IBS, urinary frequency, chronic low back sciatic and leg pain presents as a new patient for evaluation of abdominal aortic ectasia incidentally seen on recent abdominal ultrasound. Patient was seen and evaluated in ER 6/2024 for generalized malaise, abdominal pain, congestion. CT abdomen pelvis at that time with moderate vascular calcification no evidence of aneurysm. Saw his PCP in follow up who ordered abdominal ultrasound to further evaluate abdominal pain, ultrasound with suggestion of possible aneurysmal dilation of abdominal aorta 2.5cm. Referred to vascular surgery for further evaluation. Intermittent epigastric abdominal discomfort. Has history of fitch's esophagus and IBS. Chronic low back pain with sciatica. Still feeling general malaise and fatigue. Hasn't seen GI yet. No family history of AAA. Patient ambulatory at baseline no assisted devices, no claudication or rest pain. Daily smoker.     7/1/24 Abdominal Ultrasound  Note is made irregular aortic wall likely aneurysmal dilatation of  the mid abdominal aorta up to 2.5    6/17/24 CT abd/pelvis  Moderate vascular calcification. There is no aneurysm.    Past Medical History:   Diagnosis Date    BPH with urinary obstruction     Gastroesophageal reflux disease with esophagitis without hemorrhage     Hyperlipidemia     Irritable bowel syndrome with diarrhea       Past Surgical History:   Procedure Laterality Date    APPENDECTOMY      COLONOSCOPY W/ BIOPSIES      IBS, had several procedures    KNEE SURGERY Left     miniscus    TENDON RELEASE Right     index finger      Social History     Socioeconomic History    Marital status:      Spouse name: Not on file    Number of children: Not on file    Years of education: Not on file    Highest education level:  Not on file   Occupational History    Not on file   Tobacco Use    Smoking status: Every Day     Types: Cigarettes     Passive exposure: Never    Smokeless tobacco: Former    Tobacco comments:     1ppd - 44 years - has tried quitting in the past 'cold turkey' but then gets sick after a few weeks    Vaping Use    Vaping status: Never Used   Substance and Sexual Activity    Alcohol use: Not Currently    Drug use: Not Currently    Sexual activity: Not on file   Other Topics Concern    Not on file   Social History Narrative    Not on file     Social Determinants of Health     Financial Resource Strain: Not on file   Food Insecurity: Not on file   Transportation Needs: Not on file   Physical Activity: Not on file   Stress: Not on file   Social Connections: Not on file   Intimate Partner Violence: Not on file   Housing Stability: Not on file    No family history on file.   Allergies   Allergen Reactions    Penicillins Hives    Codeine Itching      Current Outpatient Medications   Medication Instructions    CYANOCOBALAMIN, VITAMIN B-12, INJ injection, Every 30 days    ergocalciferol (VITAMIN D-2) 50,000 Units, oral, Once Weekly    psyllium (Metamucil) 3.4 gram packet 1 packet, oral, Daily RT          Review of Systems   Constitutional:  Positive for fatigue. Negative for chills and fever.   HENT:  Negative for congestion, sore throat and trouble swallowing.    Eyes:  Negative for visual disturbance.   Respiratory:  Negative for cough, shortness of breath and wheezing.    Cardiovascular:  Negative for chest pain, palpitations and leg swelling.   Gastrointestinal:  Positive for abdominal pain. Negative for constipation, diarrhea, nausea and vomiting.   Endocrine: Negative for cold intolerance and heat intolerance.   Genitourinary:  Negative for difficulty urinating.   Musculoskeletal:  Negative for arthralgias, joint swelling and neck pain.   Skin:  Negative for color change and wound.   Neurological:  Negative for  "dizziness, seizures, syncope, facial asymmetry, speech difficulty, weakness, light-headedness, numbness and headaches.   Hematological:  Negative for adenopathy.   Psychiatric/Behavioral:  Negative for behavioral problems, confusion and sleep disturbance.      Vitals:    08/09/24 0937   BP: 114/71   BP Location: Left arm   Patient Position: Sitting   BP Cuff Size: Adult   Pulse: 78   Weight: 70.3 kg (155 lb)   Height: 1.753 m (5' 9\")      Objective   Physical Exam  Constitutional:       Appearance: Normal appearance.   HENT:      Head: Normocephalic.      Right Ear: External ear normal.      Left Ear: External ear normal.      Nose: Nose normal.      Mouth/Throat:      Mouth: Mucous membranes are moist.   Eyes:      Extraocular Movements: Extraocular movements intact.   Neck:      Vascular: No carotid bruit.   Cardiovascular:      Rate and Rhythm: Normal rate and regular rhythm.      Pulses: Normal pulses.   Pulmonary:      Effort: Pulmonary effort is normal. No respiratory distress.      Breath sounds: Normal breath sounds.   Abdominal:      Palpations: Abdomen is soft.      Tenderness: There is no abdominal tenderness.   Musculoskeletal:         General: No swelling or tenderness.      Cervical back: Normal range of motion and neck supple.      Right lower leg: No edema.      Left lower leg: No edema.   Skin:     General: Skin is warm and dry.      Capillary Refill: Capillary refill takes less than 2 seconds.      Findings: No lesion.   Neurological:      General: No focal deficit present.      Mental Status: He is alert and oriented to person, place, and time. Mental status is at baseline.   Psychiatric:         Mood and Affect: Mood normal.         Behavior: Behavior normal.         Thought Content: Thought content normal.         Judgment: Judgment normal.         Assessment/Plan   59 year old male with past medical history of insomnia, barretts esophagus, IBS, urinary frequency, chronic low back sciatic and " leg pain presents as a new patient for evaluation of abdominal aortic ectasia incidentally seen on recent abdominal ultrasound.   -Reviewed results of recent abdominal CT as well as abdominal ultrasound. Mild dilation of abdominal aorta 2.5cm  -Discussed AAA and indications for intervention vs surveillance  -Will obtain vascular aortic ultrasound for baseline  -continue good BP control  -Follow up 1 year  -Discussed abdominal pain and general malaise not likely related to aortic ectasia.   -Recommend follow up with GI for further evaluation   -smoking cessation encouraged           Aleisha Toribio PA-C 08/09/24 10:01 AM

## 2024-08-26 ENCOUNTER — APPOINTMENT (OUTPATIENT)
Dept: VASCULAR MEDICINE | Facility: CLINIC | Age: 59
End: 2024-08-26
Payer: COMMERCIAL

## 2024-11-18 ENCOUNTER — APPOINTMENT (OUTPATIENT)
Dept: PRIMARY CARE | Facility: CLINIC | Age: 59
End: 2024-11-18
Payer: COMMERCIAL

## 2024-12-02 ENCOUNTER — APPOINTMENT (OUTPATIENT)
Dept: PRIMARY CARE | Facility: CLINIC | Age: 59
End: 2024-12-02
Payer: COMMERCIAL

## 2024-12-19 ENCOUNTER — APPOINTMENT (OUTPATIENT)
Dept: PRIMARY CARE | Facility: CLINIC | Age: 59
End: 2024-12-19
Payer: COMMERCIAL

## 2024-12-23 ENCOUNTER — OFFICE VISIT (OUTPATIENT)
Dept: URGENT CARE | Age: 59
End: 2024-12-23
Payer: COMMERCIAL

## 2024-12-23 VITALS
HEIGHT: 69 IN | OXYGEN SATURATION: 97 % | BODY MASS INDEX: 23.61 KG/M2 | HEART RATE: 93 BPM | RESPIRATION RATE: 14 BRPM | DIASTOLIC BLOOD PRESSURE: 62 MMHG | WEIGHT: 159.39 LBS | TEMPERATURE: 97.8 F | SYSTOLIC BLOOD PRESSURE: 114 MMHG

## 2024-12-23 DIAGNOSIS — L30.9 ECZEMA, UNSPECIFIED TYPE: ICD-10-CM

## 2024-12-23 DIAGNOSIS — R21 RASH: Primary | ICD-10-CM

## 2024-12-23 DIAGNOSIS — R51.9 ACUTE NONINTRACTABLE HEADACHE, UNSPECIFIED HEADACHE TYPE: ICD-10-CM

## 2024-12-23 LAB
POC RAPID INFLUENZA A: NEGATIVE
POC RAPID INFLUENZA B: NEGATIVE
POC SARS-COV-2 AG BINAX: NORMAL

## 2024-12-23 PROCEDURE — 99213 OFFICE O/P EST LOW 20 MIN: CPT

## 2024-12-23 PROCEDURE — 3008F BODY MASS INDEX DOCD: CPT

## 2024-12-23 PROCEDURE — 4004F PT TOBACCO SCREEN RCVD TLK: CPT

## 2024-12-23 PROCEDURE — 87811 SARS-COV-2 COVID19 W/OPTIC: CPT

## 2024-12-23 PROCEDURE — 87804 INFLUENZA ASSAY W/OPTIC: CPT

## 2024-12-23 RX ORDER — HYDROCORTISONE 25 MG/G
CREAM TOPICAL 2 TIMES DAILY
Qty: 28 G | Refills: 0 | Status: SHIPPED | OUTPATIENT
Start: 2024-12-23 | End: 2025-01-06

## 2024-12-23 RX ORDER — PREDNISONE 50 MG/1
50 TABLET ORAL DAILY
Qty: 5 TABLET | Refills: 0 | Status: SHIPPED | OUTPATIENT
Start: 2024-12-23 | End: 2024-12-28

## 2024-12-23 RX ORDER — DOXYCYCLINE 100 MG/1
100 CAPSULE ORAL 2 TIMES DAILY
Qty: 20 CAPSULE | Refills: 0 | Status: SHIPPED | OUTPATIENT
Start: 2024-12-23 | End: 2025-01-02

## 2024-12-23 ASSESSMENT — PAIN SCALES - GENERAL: PAINLEVEL_OUTOF10: 5

## 2024-12-23 NOTE — PROGRESS NOTES
Subjective   Patient ID: Brennen Reyes is a 59 y.o. male. They present today with a chief complaint of Headache (Since this morning), Other (Sore lymph nodes), and Hand Pain (Bilateral hands cracked and peeling x 5-6 days).    History of Present Illness  59-year-old male presents urgent care for complaint of rash to his bilateral hands is been ongoing for the past 5 to 6 days as well as a intermittent 3/10 intensity headache that started this morning, gradual not worst headache of his life, denies any vision changes, nausea or vomiting, focal deficits.  Further denies any fevers or chills, sweats, chest pain shortness breath, abdominal pain.  Patient neurovascular intact.  States he does have history of eczema usually uses over-the-counter hydrocortisone cream usually helps his rash.  States not helping currently and the rash on his bilateral hands is becoming painful.  There is a scaling/peeling rash in the webspace between all digits on bilateral hands.  No active discharge or bleeding.  Mild tenderness to palpation.  No significant erythema.  Patient states he he does have allergy to penicillins.  COVID and flu negative.  Prescribed doxycycline, prednisone, hydrocortisone cream.  Also educated home care and supportive care.  Placed referral to dermatology for patient to follow-up with this week.  Return/ER precautions.  Patient agrees with plan.          Past Medical History  Allergies as of 12/23/2024 - Reviewed 12/23/2024   Allergen Reaction Noted    Penicillins Hives 09/25/2008    Codeine Itching 08/27/2014       (Not in a hospital admission)       Past Medical History:   Diagnosis Date    BPH with urinary obstruction     Gastroesophageal reflux disease with esophagitis without hemorrhage     Hyperlipidemia     Irritable bowel syndrome with diarrhea        Past Surgical History:   Procedure Laterality Date    APPENDECTOMY      COLONOSCOPY W/ BIOPSIES      IBS, had several procedures    KNEE SURGERY Left      "miniscus    TENDON RELEASE Right     index finger        reports that he has been smoking cigarettes. He has never been exposed to tobacco smoke. He has quit using smokeless tobacco. He reports that he does not currently use alcohol. He reports that he does not currently use drugs.    Review of Systems  Review of Systems   All other systems reviewed and are negative.                                 Objective    Vitals:    12/23/24 1429   BP: 114/62   Pulse: 93   Resp: 14   Temp: 36.6 °C (97.8 °F)   SpO2: 97%   Weight: 72.3 kg (159 lb 6.3 oz)   Height: 1.753 m (5' 9\")     No LMP for male patient.    Physical Exam  Vitals reviewed.   Constitutional:       General: He is not in acute distress.     Appearance: Normal appearance. He is not ill-appearing, toxic-appearing or diaphoretic.   HENT:      Head: Normocephalic and atraumatic.      Right Ear: Tympanic membrane, ear canal and external ear normal.      Left Ear: Tympanic membrane, ear canal and external ear normal.      Nose: Nose normal.      Mouth/Throat:      Mouth: Mucous membranes are moist.      Pharynx: Oropharynx is clear.   Eyes:      Extraocular Movements: Extraocular movements intact.      Conjunctiva/sclera: Conjunctivae normal.      Pupils: Pupils are equal, round, and reactive to light.   Cardiovascular:      Rate and Rhythm: Normal rate and regular rhythm.   Pulmonary:      Effort: Pulmonary effort is normal. No respiratory distress.      Breath sounds: Normal breath sounds. No stridor. No wheezing, rhonchi or rales.   Abdominal:      General: Abdomen is flat.      Palpations: Abdomen is soft.      Tenderness: There is no abdominal tenderness. There is no right CVA tenderness or left CVA tenderness.   Musculoskeletal:      Cervical back: Normal range of motion and neck supple. No rigidity or tenderness.   Lymphadenopathy:      Cervical: No cervical adenopathy.   Skin:     General: Skin is warm and dry.      Comments: Scaling/peeling rash in the " webspace of all digits on bilateral upper extremities.  No active discharge or bleeding.  No significant tenderness to palpation.  No significant surrounding erythema or warmth.  Bilateral ear pulses intact and symmetrical.  Full range of motion and sensation bilateral upper extremities.   Neurological:      General: No focal deficit present.      Mental Status: He is alert and oriented to person, place, and time.   Psychiatric:         Mood and Affect: Mood normal.         Behavior: Behavior normal.         Procedures    Point of Care Test & Imaging Results from this visit  Results for orders placed or performed in visit on 12/23/24   POCT Influenza A/B manually resulted   Result Value Ref Range    POC Rapid Influenza A Negative Negative    POC Rapid Influenza B Negative Negative   POCT Covid-19 Rapid Antigen   Result Value Ref Range    POC SHANELLE-COV-2 AG  Presumptive negative test for SARS-CoV-2 (no antigen detected)     Presumptive negative test for SARS-CoV-2 (no antigen detected)      No results found.    Diagnostic study results (if any) were reviewed by Lorene Reeves PA-C.    Assessment/Plan   Allergies, medications, history, and pertinent labs/EKGs/Imaging reviewed by Lorene Reeves PA-C.     Medical Decision Making  59-year-old male presents urgent care for complaint of rash to his bilateral hands is been ongoing for the past 5 to 6 days as well as a intermittent 3/10 intensity headache that started this morning, gradual not worst headache of his life, denies any vision changes, nausea or vomiting, focal deficits.  Further denies any fevers or chills, sweats, chest pain shortness breath, abdominal pain.  Patient neurovascular intact.  States he does have history of eczema usually uses over-the-counter hydrocortisone cream usually helps his rash.  States not helping currently and the rash on his bilateral hands is becoming painful.  There is a scaling/peeling rash in the webspace between all digits  on bilateral hands.  No active discharge or bleeding.  Mild tenderness to palpation.  No significant erythema.  Patient states he he does have allergy to penicillins.  COVID and flu negative.  Prescribed doxycycline, prednisone, hydrocortisone cream.  Also educated home care and supportive care.  Placed referral to dermatology for patient to follow-up with this week.  Return/ER precautions.  Patient agrees with plan.    Orders and Diagnoses  Diagnoses and all orders for this visit:  Acute nonintractable headache, unspecified headache type  -     POCT Influenza A/B manually resulted  -     POCT Covid-19 Rapid Antigen      Medical Admin Record      Patient disposition: Home    Electronically signed by Lorene Reeves PA-C  3:01 PM

## 2025-01-09 ENCOUNTER — HOSPITAL ENCOUNTER (OUTPATIENT)
Dept: RADIOLOGY | Facility: HOSPITAL | Age: 60
Discharge: HOME | End: 2025-01-09
Payer: COMMERCIAL

## 2025-01-09 DIAGNOSIS — I77.811 ABDOMINAL AORTIC ECTASIA (CMS-HCC): ICD-10-CM

## 2025-01-09 DIAGNOSIS — I77.819 AORTIC ECTASIA: ICD-10-CM

## 2025-01-09 PROCEDURE — 93978 VASCULAR STUDY: CPT

## 2025-01-09 PROCEDURE — 93978 VASCULAR STUDY: CPT | Performed by: INTERNAL MEDICINE

## 2025-01-13 NOTE — PROGRESS NOTES
Patient ID:   Brennen Reyes is a 59 y.o. male with PMH remarkable for fitch's esophagus, IBS-D, diverticulosis, HLD who presents to the office today for Follow-up, Dizziness (Upon standing and/or bending over), Muscle Pain (Aches all over), and Depression (Just doesn't feel good).    HEALTH MAINTENANCE: Follow Up  Last Office Visit: 7/1/2024 to establish care and ER FU. Endorsed back and leg pain, insomnia from chronic pain. Lab work abdominal US was ordered.   Last Labs: 7/2/2024  PSA Screening (starting at age 55 till 69): 1.70 on 7/2/2024  Colonoscopy (45-75 or age 40 with 1st degree relative dx colon ca): 7/2/2018 with Dr Lomeli showing diverticulosis, small internal hemorrhoids. 5 yr repeat. DUE  Lung cancer screening (50-76 y/o x 20 pk yr for at least 20 yrs + current smoker OR quit in last 15 years, no CT w/I last year): no nodules noted on 6/17/2024 scan  - reports that he stopped taking vitamin B12 injection and vitamin D.   - had covid in beg of Nov. Now has a chronic cough and joints are all aching.     7/1/24 Abdominal Ultrasound Note is made irregular aortic wall likely aneurysmal dilatation of the mid abdominal aorta up to 2.5    1/9/2025 US aorta duplex showing The abdominal aorta and bilateral common iliac arteries demonstrate no evidence of aneurysm. Areas of mild atherosclerotic plaque visualized without hemodynamically significant stenosis.     Reports that he wakes up to urinate ~1-2x per night.  Has a productive cough since having covid.  Getting dizzy when bending down. Denies falls, double vision. Going on since November on/off.  Feels like he has a problem with dehydration, forgets to drink.  Currently smokes 1ppd  Has slight chest discomfort with deep breath.     Feels depressed, difficult to get through the day. Works as a . Difficulty sleeping at night. Never spoke with psych regarding this. Feels like is more physical regarding the depression.   - discussed with pt regarding  "medication for mood and pain.   - start on cymbalta 30mg    Will get orthostatics  - appears to be dehydrated    Vit d b12  Start on vitamin D 2000 units daily  Start on vitamin B12 IM injection monthly    HPI  Social History     Tobacco Use   • Smoking status: Every Day     Types: Cigarettes     Passive exposure: Never   • Smokeless tobacco: Former   • Tobacco comments:     1ppd - 44 years - has tried quitting in the past 'cold turkey' but then gets sick after a few weeks    Vaping Use   • Vaping status: Never Used   Substance Use Topics   • Alcohol use: Not Currently   • Drug use: Not Currently     Review of Systems   Constitutional: Negative.    HENT: Negative.     Eyes: Negative.    Respiratory: Negative.     Cardiovascular: Negative.    Gastrointestinal: Negative.    Endocrine: Negative.    Genitourinary: Negative.    Musculoskeletal:  Positive for myalgias.   Skin: Negative.    Neurological:  Positive for dizziness and light-headedness.   Psychiatric/Behavioral:  Positive for behavioral problems and sleep disturbance.         Increased depression   All other systems reviewed and are negative.    Visit Vitals  /73 (BP Location: Left arm, Patient Position: Standing)   Pulse 85   Resp 18   Ht 1.753 m (5' 9\")   Wt 69.4 kg (153 lb)   SpO2 97%   BMI 22.59 kg/m²   Smoking Status Every Day   BSA 1.84 m²     Physical Exam  Vitals reviewed. Exam conducted with a chaperone present.   Constitutional:       Appearance: Normal appearance. He is well-developed.   HENT:      Head: Normocephalic.      Right Ear: External ear normal.      Left Ear: External ear normal.      Nose: Nose normal.      Mouth/Throat:      Lips: Pink.      Mouth: Mucous membranes are moist.   Eyes:      General: Lids are normal.      Pupils: Pupils are equal, round, and reactive to light.   Neck:      Trachea: Trachea normal.   Cardiovascular:      Rate and Rhythm: Normal rate and regular rhythm.      Heart sounds: Normal heart sounds. "   Pulmonary:      Effort: Pulmonary effort is normal.      Breath sounds: Decreased breath sounds present.   Abdominal:      General: Bowel sounds are normal.      Palpations: Abdomen is soft.   Musculoskeletal:      Cervical back: Full passive range of motion without pain.      Comments: myalgia   Skin:     General: Skin is warm and moist.   Neurological:      General: No focal deficit present.      Mental Status: He is alert and oriented to person, place, and time. Mental status is at baseline.   Psychiatric:         Attention and Perception: Attention normal.         Mood and Affect: Mood is depressed.         Speech: Speech normal.         Behavior: Behavior is cooperative.         Thought Content: Thought content normal.         Cognition and Memory: Cognition normal.         Judgment: Judgment normal.     Current Outpatient Medications   Medication Instructions   • psyllium (Metamucil) 3.4 gram packet 1 packet, Daily RT      Lab Results   Component Value Date    WBC 10.6 07/02/2024    HGB 17.4 07/02/2024    HCT 51.5 07/02/2024     07/02/2024    CHOL 180 07/02/2024    TRIG 61 07/02/2024    HDL 50.6 07/02/2024    ALT 20 07/02/2024    AST 18 07/02/2024     07/02/2024    K 4.4 07/02/2024     07/02/2024    CREATININE 1.26 07/02/2024    BUN 14 07/02/2024    CO2 27 07/02/2024    TSH 1.89 07/02/2024    HGBA1C 5.4 07/02/2024       Problem List Items Addressed This Visit             ICD-10-CM    Tobacco use disorder F17.200     - no interest in quitting at this time  Ready to quit: Not Answered  Counseling given: Not Answered  Tobacco comments: 1ppd - 44 years - has tried quitting in the past 'cold turkey' but then gets sick after a few weeks             Dizziness R42     - orthostatics negative  - appears to be dehydrated on exam  - pt admits that he does not drink much water  - advised to increase water intake  - if no imp, let us know please for further testing         Relevant Medications     cholecalciferol (Vitamin D-3) 25 MCG (1000 UT) capsule    Health care maintenance Z00.00     - colonoscopy screening ordered  - FU in 4m or sooner if needed         Relevant Medications    cholecalciferol (Vitamin D-3) 25 MCG (1000 UT) capsule    Depression F32.A     - will start on cymbalta for nerve pain and mood  - FU in 4 months or sooner if needed  - let us know if this medication does not work out for you         Relevant Medications    DULoxetine (Cymbalta) 30 mg DR capsule    Vitamin D deficiency E55.9     - start on vitamin D 2000u QD         Relevant Medications    cholecalciferol (Vitamin D-3) 25 MCG (1000 UT) capsule    Vitamin B12 deficiency E53.8     - start on IM vitamin B12 injections          Other Visit Diagnoses         Codes    Encounter for screening for malignant neoplasm of colon     Z12.11    Relevant Orders    Colonoscopy Screening; High Risk Patient; 7/2/2018 with Dr Lomeli showing diverticulosis, small internal hemorrhoids. 5 yr repeat. DUE            --------------------  Written by Briseyda Huizar RN, acting as a scribe for Dr. Ureña. This note accurately reflects the work and decisions made by Dr. Ureña.     I, Dr. Ureña, attest all medical record entries made by the scribe were under my direction and were personally dictated by me. I have reviewed the chart and agree that the record accurately reflects my performance of the history, physical exam, and assessment and plan.

## 2025-01-14 ENCOUNTER — APPOINTMENT (OUTPATIENT)
Dept: PRIMARY CARE | Facility: CLINIC | Age: 60
End: 2025-01-14
Payer: COMMERCIAL

## 2025-01-14 VITALS
WEIGHT: 153 LBS | HEART RATE: 85 BPM | BODY MASS INDEX: 22.66 KG/M2 | SYSTOLIC BLOOD PRESSURE: 113 MMHG | OXYGEN SATURATION: 97 % | HEIGHT: 69 IN | RESPIRATION RATE: 18 BRPM | DIASTOLIC BLOOD PRESSURE: 73 MMHG

## 2025-01-14 DIAGNOSIS — R42 DIZZINESS: ICD-10-CM

## 2025-01-14 DIAGNOSIS — E53.8 VITAMIN B12 DEFICIENCY: ICD-10-CM

## 2025-01-14 DIAGNOSIS — F32.A DEPRESSION, UNSPECIFIED DEPRESSION TYPE: ICD-10-CM

## 2025-01-14 DIAGNOSIS — E55.9 VITAMIN D DEFICIENCY: ICD-10-CM

## 2025-01-14 DIAGNOSIS — Z12.11 ENCOUNTER FOR SCREENING FOR MALIGNANT NEOPLASM OF COLON: ICD-10-CM

## 2025-01-14 DIAGNOSIS — F17.200 TOBACCO USE DISORDER: ICD-10-CM

## 2025-01-14 DIAGNOSIS — Z00.00 HEALTH CARE MAINTENANCE: ICD-10-CM

## 2025-01-14 PROCEDURE — 3008F BODY MASS INDEX DOCD: CPT | Performed by: INTERNAL MEDICINE

## 2025-01-14 PROCEDURE — 99214 OFFICE O/P EST MOD 30 MIN: CPT | Performed by: INTERNAL MEDICINE

## 2025-01-14 PROCEDURE — 96372 THER/PROPH/DIAG INJ SC/IM: CPT | Performed by: INTERNAL MEDICINE

## 2025-01-14 RX ORDER — VIT C/E/ZN/COPPR/LUTEIN/ZEAXAN 250MG-90MG
50 CAPSULE ORAL DAILY
Qty: 90 CAPSULE | Refills: 1 | Status: SHIPPED | OUTPATIENT
Start: 2025-01-14

## 2025-01-14 RX ORDER — CYANOCOBALAMIN 1000 UG/ML
1000 INJECTION, SOLUTION INTRAMUSCULAR; SUBCUTANEOUS ONCE
Status: COMPLETED | OUTPATIENT
Start: 2025-01-14 | End: 2025-01-14

## 2025-01-14 RX ORDER — DULOXETIN HYDROCHLORIDE 30 MG/1
30 CAPSULE, DELAYED RELEASE ORAL DAILY
Qty: 90 CAPSULE | Refills: 0 | Status: SHIPPED | OUTPATIENT
Start: 2025-01-14

## 2025-01-14 RX ADMIN — CYANOCOBALAMIN 1000 MCG: 1000 INJECTION, SOLUTION INTRAMUSCULAR; SUBCUTANEOUS at 15:31

## 2025-01-14 ASSESSMENT — PAIN SCALES - GENERAL: PAINLEVEL_OUTOF10: 4

## 2025-01-14 ASSESSMENT — PATIENT HEALTH QUESTIONNAIRE - PHQ9
SUM OF ALL RESPONSES TO PHQ9 QUESTIONS 1 AND 2: 2
1. LITTLE INTEREST OR PLEASURE IN DOING THINGS: SEVERAL DAYS
2. FEELING DOWN, DEPRESSED OR HOPELESS: SEVERAL DAYS
10. IF YOU CHECKED OFF ANY PROBLEMS, HOW DIFFICULT HAVE THESE PROBLEMS MADE IT FOR YOU TO DO YOUR WORK, TAKE CARE OF THINGS AT HOME, OR GET ALONG WITH OTHER PEOPLE: VERY DIFFICULT

## 2025-01-14 NOTE — PATIENT INSTRUCTIONS
It was nice to see you in the office today!  As discussed during our visit...     Drink water.  START on cymbalta 30mg daily  START on vitamin D 2000 units daily  Please make appointment's for your vitamin B12 IM injections in the office  Follow up in 4 months or sooner if needed.  -------------  I have placed a referral for you to have a colonoscopy with Dr. Caba at [facility].   Dr. Caba's office will reach out to you to either make an appointment for the colonoscopy OR for a consultation in the office first.     Dr Caba's Office Location:  [facility]  [address] [address]  Appointments:   [phone]

## 2025-01-16 PROBLEM — F32.A DEPRESSION: Status: ACTIVE | Noted: 2025-01-16

## 2025-01-16 PROBLEM — E53.8 VITAMIN B12 DEFICIENCY: Status: ACTIVE | Noted: 2025-01-16

## 2025-01-16 PROBLEM — E55.9 VITAMIN D DEFICIENCY: Status: ACTIVE | Noted: 2025-01-16

## 2025-01-16 ASSESSMENT — ENCOUNTER SYMPTOMS
CARDIOVASCULAR NEGATIVE: 1
MYALGIAS: 1
CONSTITUTIONAL NEGATIVE: 1
EYES NEGATIVE: 1
ENDOCRINE NEGATIVE: 1
SLEEP DISTURBANCE: 1
DIZZINESS: 1
LIGHT-HEADEDNESS: 1
GASTROINTESTINAL NEGATIVE: 1
RESPIRATORY NEGATIVE: 1

## 2025-01-16 NOTE — ASSESSMENT & PLAN NOTE
- no interest in quitting at this time  Ready to quit: Not Answered  Counseling given: Not Answered  Tobacco comments: 1ppd - 44 years - has tried quitting in the past 'cold turkey' but then gets sick after a few weeks

## 2025-01-16 NOTE — ASSESSMENT & PLAN NOTE
- will start on cymbalta for nerve pain and mood  - FU in 4 months or sooner if needed  - let us know if this medication does not work out for you

## 2025-01-16 NOTE — ASSESSMENT & PLAN NOTE
- orthostatics negative  - appears to be dehydrated on exam  - pt admits that he does not drink much water  - advised to increase water intake  - if no imp, let us know please for further testing

## 2025-01-21 ENCOUNTER — APPOINTMENT (OUTPATIENT)
Dept: PRIMARY CARE | Facility: CLINIC | Age: 60
End: 2025-01-21
Payer: COMMERCIAL

## 2025-01-21 DIAGNOSIS — E53.8 VITAMIN B12 DEFICIENCY: ICD-10-CM

## 2025-01-21 PROCEDURE — 96372 THER/PROPH/DIAG INJ SC/IM: CPT | Performed by: INTERNAL MEDICINE

## 2025-01-21 RX ORDER — CYANOCOBALAMIN 1000 UG/ML
1000 INJECTION, SOLUTION INTRAMUSCULAR; SUBCUTANEOUS ONCE
Status: COMPLETED | OUTPATIENT
Start: 2025-01-21 | End: 2025-01-21

## 2025-01-21 RX ADMIN — CYANOCOBALAMIN 1000 MCG: 1000 INJECTION, SOLUTION INTRAMUSCULAR; SUBCUTANEOUS at 15:12

## 2025-01-28 ENCOUNTER — APPOINTMENT (OUTPATIENT)
Dept: PRIMARY CARE | Facility: CLINIC | Age: 60
End: 2025-01-28
Payer: COMMERCIAL

## 2025-01-28 DIAGNOSIS — E53.8 VITAMIN B12 DEFICIENCY: Primary | ICD-10-CM

## 2025-01-28 PROCEDURE — 96372 THER/PROPH/DIAG INJ SC/IM: CPT | Performed by: INTERNAL MEDICINE

## 2025-01-28 RX ORDER — CYANOCOBALAMIN 1000 UG/ML
1000 INJECTION, SOLUTION INTRAMUSCULAR; SUBCUTANEOUS ONCE
Status: COMPLETED | OUTPATIENT
Start: 2025-01-28 | End: 2025-01-28

## 2025-01-28 RX ADMIN — CYANOCOBALAMIN 1000 MCG: 1000 INJECTION, SOLUTION INTRAMUSCULAR; SUBCUTANEOUS at 14:55

## 2025-02-25 ENCOUNTER — CLINICAL SUPPORT (OUTPATIENT)
Dept: PRIMARY CARE | Facility: CLINIC | Age: 60
End: 2025-02-25
Payer: COMMERCIAL

## 2025-02-25 ENCOUNTER — APPOINTMENT (OUTPATIENT)
Dept: PRIMARY CARE | Facility: CLINIC | Age: 60
End: 2025-02-25
Payer: COMMERCIAL

## 2025-02-25 DIAGNOSIS — E53.8 VITAMIN B12 DEFICIENCY: Primary | ICD-10-CM

## 2025-02-25 PROCEDURE — 96372 THER/PROPH/DIAG INJ SC/IM: CPT | Performed by: INTERNAL MEDICINE

## 2025-02-25 RX ORDER — CYANOCOBALAMIN 1000 UG/ML
1000 INJECTION, SOLUTION INTRAMUSCULAR; SUBCUTANEOUS ONCE
Status: COMPLETED | OUTPATIENT
Start: 2025-02-25 | End: 2025-02-25

## 2025-02-25 RX ADMIN — CYANOCOBALAMIN 1000 MCG: 1000 INJECTION, SOLUTION INTRAMUSCULAR; SUBCUTANEOUS at 14:41

## 2025-04-14 ENCOUNTER — CLINICAL SUPPORT (OUTPATIENT)
Dept: PRIMARY CARE | Facility: CLINIC | Age: 60
End: 2025-04-14
Payer: COMMERCIAL

## 2025-04-14 DIAGNOSIS — E53.8 VITAMIN B12 DEFICIENCY: Primary | ICD-10-CM

## 2025-04-14 PROCEDURE — 96372 THER/PROPH/DIAG INJ SC/IM: CPT | Performed by: INTERNAL MEDICINE

## 2025-04-14 RX ORDER — CYANOCOBALAMIN 1000 UG/ML
1000 INJECTION, SOLUTION INTRAMUSCULAR; SUBCUTANEOUS ONCE
Status: COMPLETED | OUTPATIENT
Start: 2025-04-14 | End: 2025-04-14

## 2025-04-14 RX ADMIN — CYANOCOBALAMIN 1000 MCG: 1000 INJECTION, SOLUTION INTRAMUSCULAR; SUBCUTANEOUS at 13:41

## 2025-05-14 ENCOUNTER — APPOINTMENT (OUTPATIENT)
Dept: PRIMARY CARE | Facility: CLINIC | Age: 60
End: 2025-05-14
Payer: COMMERCIAL

## 2025-05-14 DIAGNOSIS — E53.8 VITAMIN B12 DEFICIENCY: Primary | ICD-10-CM

## 2025-05-14 PROCEDURE — 96372 THER/PROPH/DIAG INJ SC/IM: CPT | Performed by: INTERNAL MEDICINE

## 2025-05-14 RX ORDER — CYANOCOBALAMIN 1000 UG/ML
1000 INJECTION, SOLUTION INTRAMUSCULAR; SUBCUTANEOUS ONCE
Status: COMPLETED | OUTPATIENT
Start: 2025-05-14 | End: 2025-05-14

## 2025-05-14 RX ADMIN — CYANOCOBALAMIN 1000 MCG: 1000 INJECTION, SOLUTION INTRAMUSCULAR; SUBCUTANEOUS at 13:44

## 2025-07-15 ENCOUNTER — TELEPHONE (OUTPATIENT)
Dept: PRIMARY CARE | Facility: CLINIC | Age: 60
End: 2025-07-15
Payer: COMMERCIAL

## 2025-07-15 DIAGNOSIS — Z12.5 PROSTATE CANCER SCREENING: ICD-10-CM

## 2025-07-15 DIAGNOSIS — E78.2 HYPERLIPIDEMIA, MIXED: ICD-10-CM

## 2025-07-15 DIAGNOSIS — E55.9 VITAMIN D DEFICIENCY: ICD-10-CM

## 2025-07-15 DIAGNOSIS — E53.8 VITAMIN B12 DEFICIENCY: Primary | ICD-10-CM

## 2025-07-15 NOTE — TELEPHONE ENCOUNTER
Patient is asking if he can restart his b12 injections, He has not had a b12 lab since 07/24 and was non compliant with injections. Last had in May

## 2025-07-17 LAB
ALBUMIN SERPL-MCNC: 4.2 G/DL (ref 3.6–5.1)
ALP SERPL-CCNC: 69 U/L (ref 35–144)
ALT SERPL-CCNC: 9 U/L (ref 9–46)
ANION GAP SERPL CALCULATED.4IONS-SCNC: 9 MMOL/L (CALC) (ref 7–17)
AST SERPL-CCNC: 15 U/L (ref 10–35)
BASOPHILS # BLD AUTO: 94 CELLS/UL (ref 0–200)
BASOPHILS NFR BLD AUTO: 0.8 %
BILIRUB SERPL-MCNC: 0.8 MG/DL (ref 0.2–1.2)
BUN SERPL-MCNC: 15 MG/DL (ref 7–25)
CALCIUM SERPL-MCNC: 9 MG/DL (ref 8.6–10.3)
CHLORIDE SERPL-SCNC: 106 MMOL/L (ref 98–110)
CHOLEST SERPL-MCNC: 168 MG/DL
CHOLEST/HDLC SERPL: 3.6 (CALC)
CO2 SERPL-SCNC: 24 MMOL/L (ref 20–32)
CREAT SERPL-MCNC: 1.05 MG/DL (ref 0.7–1.35)
EGFRCR SERPLBLD CKD-EPI 2021: 81 ML/MIN/1.73M2
EOSINOPHIL # BLD AUTO: 187 CELLS/UL (ref 15–500)
EOSINOPHIL NFR BLD AUTO: 1.6 %
ERYTHROCYTE [DISTWIDTH] IN BLOOD BY AUTOMATED COUNT: 12.7 % (ref 11–15)
GLUCOSE SERPL-MCNC: 90 MG/DL (ref 65–99)
HCT VFR BLD AUTO: 47.7 % (ref 38.5–50)
HDLC SERPL-MCNC: 47 MG/DL
HGB BLD-MCNC: 16 G/DL (ref 13.2–17.1)
LDLC SERPL CALC-MCNC: 104 MG/DL (CALC)
LYMPHOCYTES # BLD AUTO: 2597 CELLS/UL (ref 850–3900)
LYMPHOCYTES NFR BLD AUTO: 22.2 %
MCH RBC QN AUTO: 31.6 PG (ref 27–33)
MCHC RBC AUTO-ENTMCNC: 33.5 G/DL (ref 32–36)
MCV RBC AUTO: 94.3 FL (ref 80–100)
MONOCYTES # BLD AUTO: 737 CELLS/UL (ref 200–950)
MONOCYTES NFR BLD AUTO: 6.3 %
NEUTROPHILS # BLD AUTO: 8085 CELLS/UL (ref 1500–7800)
NEUTROPHILS NFR BLD AUTO: 69.1 %
NONHDLC SERPL-MCNC: 121 MG/DL (CALC)
PLATELET # BLD AUTO: 236 THOUSAND/UL (ref 140–400)
PMV BLD REES-ECKER: 9.6 FL (ref 7.5–12.5)
POTASSIUM SERPL-SCNC: 4.1 MMOL/L (ref 3.5–5.3)
PROT SERPL-MCNC: 6.4 G/DL (ref 6.1–8.1)
PSA SERPL-MCNC: 2.66 NG/ML
RBC # BLD AUTO: 5.06 MILLION/UL (ref 4.2–5.8)
SODIUM SERPL-SCNC: 139 MMOL/L (ref 135–146)
TRIGL SERPL-MCNC: 83 MG/DL
TSH SERPL-ACNC: 1.71 MIU/L (ref 0.4–4.5)
VIT B12 SERPL-MCNC: 424 PG/ML (ref 200–1100)
WBC # BLD AUTO: 11.7 THOUSAND/UL (ref 3.8–10.8)

## 2025-07-21 ENCOUNTER — OFFICE VISIT (OUTPATIENT)
Dept: PRIMARY CARE | Facility: CLINIC | Age: 60
End: 2025-07-21
Payer: COMMERCIAL

## 2025-07-21 VITALS
DIASTOLIC BLOOD PRESSURE: 68 MMHG | HEART RATE: 65 BPM | BODY MASS INDEX: 21.62 KG/M2 | RESPIRATION RATE: 18 BRPM | OXYGEN SATURATION: 98 % | HEIGHT: 69 IN | WEIGHT: 146 LBS | SYSTOLIC BLOOD PRESSURE: 110 MMHG

## 2025-07-21 DIAGNOSIS — M79.10 MYALGIA: ICD-10-CM

## 2025-07-21 DIAGNOSIS — G47.00 INSOMNIA, UNSPECIFIED TYPE: ICD-10-CM

## 2025-07-21 DIAGNOSIS — R63.4 UNINTENTIONAL WEIGHT LOSS: Primary | ICD-10-CM

## 2025-07-21 LAB — POC SARS-COV-2 AG BINAX: NORMAL

## 2025-07-21 PROCEDURE — 99214 OFFICE O/P EST MOD 30 MIN: CPT

## 2025-07-21 PROCEDURE — 3008F BODY MASS INDEX DOCD: CPT

## 2025-07-21 PROCEDURE — 87811 SARS-COV-2 COVID19 W/OPTIC: CPT

## 2025-07-21 RX ORDER — TRAZODONE HYDROCHLORIDE 50 MG/1
25 TABLET ORAL NIGHTLY PRN
Qty: 30 TABLET | Refills: 0 | Status: SHIPPED | OUTPATIENT
Start: 2025-07-21 | End: 2026-07-21

## 2025-07-21 ASSESSMENT — ENCOUNTER SYMPTOMS
DIAPHORESIS: 1
CHILLS: 0
DIZZINESS: 0
CONSTIPATION: 1
RECTAL PAIN: 0
FEVER: 0
SHORTNESS OF BREATH: 0
DIFFICULTY URINATING: 0
LIGHT-HEADEDNESS: 0
VOMITING: 0
DYSURIA: 0
UNEXPECTED WEIGHT CHANGE: 1
COUGH: 0
WEAKNESS: 1
FREQUENCY: 0
HEADACHES: 1
ABDOMINAL PAIN: 1
JOINT SWELLING: 0
NAUSEA: 0
SLEEP DISTURBANCE: 1
HEMATURIA: 0
BLOOD IN STOOL: 1
APPETITE CHANGE: 1
MYALGIAS: 1
FLANK PAIN: 0
ACTIVITY CHANGE: 1
DIARRHEA: 1

## 2025-07-21 ASSESSMENT — PAIN SCALES - GENERAL: PAINLEVEL_OUTOF10: 3

## 2025-07-21 NOTE — ASSESSMENT & PLAN NOTE
- Down 7# since January 2025  - Labs from 7/16/25 significant for WBC 11.7   - Due for repeat colonoscopy; ordered today   - CT lung screening ordered, pt is a current smoker, no recent lung imaging  - Check PSA  - Discussed trial of protein shakes at today's visit, pt agreeable

## 2025-07-21 NOTE — LETTER
July 21, 2025     Patient: Brennen Reyes   YOB: 1965   Date of Visit: 7/21/2025       To Whom It May Concern:    Brennen Reyes was seen in my clinic on 7/21/2025 at 11:40 am. Please excuse Brennen for his absence from work on this day to make the appointment.    If you have any questions or concerns, please don't hesitate to call.         Sincerely,         Bekah Horta PA-C        CC: No Recipients

## 2025-07-21 NOTE — PROGRESS NOTES
"Patient ID:   Brennen Reyes is a 60 y.o. male with PMH remarkable for fitch's esophagus, IBS-D, diverticulosis, HLD, B12 deficiency who presents to the office today for Abdominal Pain, Headache, Muscle Pain, and Weight Loss (Not intentionally losing weight).    Last Office Visit: 1/14/25 with Dr. Ureña    HPI:    Labs from 7/16/25 reviewed, significant for WBC 11.7, remainder of labs were normal    Colonoscopy 7/2/2018 with Dr Lomeli showing diverticulosis, small internal hemorrhoids. 5 yr repeat recommended- OVERDUE     Brennen presents to the office today for multiple complaints. He reports that he is \"just not feeling good\". When asked to elaborate, he states that it is \"everything he can think of\". He reports that he has been having intermittent lower abdominal pain for about a year now. He has chronic diarrhea and constipation due to IBS and denies any changes in his stool. He thinks he may have had some blood in his stool about a month ago. He complains of hot flashes and \"tightness\" in his right lung that has been going on for weeks. He says that he has body aches all over. He also notes that his weight is dropping in recent months despite eating \"lots of garbage\" to try and increase his calorie intake. No chest pain or shortness of breath reported. He does not think he has had any fevers but complains of intermittent hot flashes and sweats. He is urinating normally and denies any hematuria, dysuria, or straining to urinate. He is currently working as a  and says he is too fatigued to work; he is inquiring about disability/FMLA at today's visit. He reports that despite his fatigue he is having trouble sleeping; he is interested in starting a medication for insomnia.    Social History     Tobacco Use    Smoking status: Every Day     Types: Cigarettes     Passive exposure: Never    Smokeless tobacco: Former    Tobacco comments:     1ppd - 44 years - has tried quitting in the past 'cold turkey' but " "then gets sick after a few weeks    Substance Use Topics    Alcohol use: Not Currently      Review of Systems   Constitutional:  Positive for activity change, appetite change, diaphoresis and unexpected weight change. Negative for chills and fever.   HENT:  Negative for congestion.    Respiratory:  Negative for cough and shortness of breath.    Cardiovascular:  Negative for chest pain and leg swelling.   Gastrointestinal:  Positive for abdominal pain, blood in stool, constipation and diarrhea. Negative for nausea, rectal pain and vomiting.   Genitourinary:  Negative for difficulty urinating, dysuria, flank pain, frequency, hematuria, penile discharge, penile pain, testicular pain and urgency.   Musculoskeletal:  Positive for myalgias. Negative for joint swelling.   Skin:  Negative for rash.   Neurological:  Positive for weakness and headaches. Negative for dizziness, syncope and light-headedness.   Psychiatric/Behavioral:  Positive for sleep disturbance.    All other systems reviewed and are negative.    Visit Vitals  /68 (BP Location: Left arm, Patient Position: Sitting)   Pulse 65   Resp 18   Ht 1.753 m (5' 9\")   Wt 66.2 kg (146 lb)   SpO2 98%   BMI 21.56 kg/m²   Smoking Status Every Day   BSA 1.8 m²     Allergies[1]   Physical Exam  Vitals and nursing note reviewed.   Constitutional:       General: He is not in acute distress.     Appearance: He is not toxic-appearing or diaphoretic.      Comments: Ambulatory in office without assistance, no conversational dyspnea   HENT:      Head: Normocephalic and atraumatic.      Mouth/Throat:      Mouth: Mucous membranes are moist.     Eyes:      General: No scleral icterus.     Conjunctiva/sclera: Conjunctivae normal.       Cardiovascular:      Rate and Rhythm: Normal rate and regular rhythm.   Pulmonary:      Effort: Pulmonary effort is normal. No respiratory distress.      Breath sounds: Normal breath sounds. No wheezing.   Abdominal:      General: Bowel sounds are " normal. There is no distension.      Palpations: Abdomen is soft.      Tenderness: There is no abdominal tenderness. There is no guarding.     Musculoskeletal:         General: No swelling.      Cervical back: Normal range of motion.     Skin:     General: Skin is warm and dry.     Neurological:      Mental Status: He is alert and oriented to person, place, and time.      Motor: No weakness.      Gait: Gait normal.     Psychiatric:         Mood and Affect: Mood normal.         Behavior: Behavior normal.       Current Outpatient Medications   Medication Instructions    DULoxetine (CYMBALTA) 30 mg, oral, Daily, Do not crush or chew.    psyllium (Metamucil) 3.4 gram packet 1 packet, Daily RT    traZODone (DESYREL) 25 mg, oral, Nightly PRN      Lab Results   Component Value Date    WBC 11.7 (H) 07/16/2025    HGB 16.0 07/16/2025    HCT 47.7 07/16/2025     07/16/2025    CHOL 168 07/16/2025    TRIG 83 07/16/2025    HDL 47 07/16/2025    ALT 9 07/16/2025    AST 15 07/16/2025     07/16/2025    K 4.1 07/16/2025     07/16/2025    CREATININE 1.05 07/16/2025    BUN 15 07/16/2025    CO2 24 07/16/2025    TSH 1.71 07/16/2025    PSA 2.66 07/16/2025    HGBA1C 5.4 07/02/2024       Assessment/Plan   Problem List Items Addressed This Visit           ICD-10-CM       Endocrine/Metabolic    Unintentional weight loss - Primary R63.4    - Down 7# since January 2025  - Labs from 7/16/25 significant for WBC 11.7   - Due for repeat colonoscopy; ordered today   - CT lung screening ordered, pt is a current smoker, no recent lung imaging  - Check PSA  - Discussed trial of protein shakes at today's visit, pt agreeable         Relevant Orders    CT lung screening low dose    Colonoscopy Screening; High Risk Patient; unintentional weight loss, hx internal hemorrhoids/diverticulosis on previous    Prostate Spec.Ag,Screen       Musculoskeletal and Injuries    Myalgia M79.10    - COVID test in office today negative  - Check CK  level           Relevant Orders    POCT BinaxNOW Covid-19 Ag Card manually resulted    CK       Sleep    Insomnia G47.00    - Trial trazodone 25 mg at bedtime PRN for insomnia           Relevant Medications    traZODone (Desyrel) 50 mg tablet     ------Follow up after completing above testing          [1]   Allergies  Allergen Reactions    Penicillins Hives    Codeine Itching

## 2025-07-25 ENCOUNTER — TELEPHONE (OUTPATIENT)
Dept: PRIMARY CARE | Facility: CLINIC | Age: 60
End: 2025-07-25
Payer: COMMERCIAL

## 2025-07-25 DIAGNOSIS — Z12.11 COLON CANCER SCREENING: ICD-10-CM

## 2025-07-25 DIAGNOSIS — R63.4 UNINTENTIONAL WEIGHT LOSS: Primary | ICD-10-CM

## 2025-07-25 RX ORDER — POLYETHYLENE GLYCOL 3350, SODIUM SULFATE ANHYDROUS, SODIUM BICARBONATE, SODIUM CHLORIDE, POTASSIUM CHLORIDE 236; 22.74; 6.74; 5.86; 2.97 G/4L; G/4L; G/4L; G/4L; G/4L
POWDER, FOR SOLUTION ORAL
Qty: 4000 ML | Refills: 0 | Status: SHIPPED | OUTPATIENT
Start: 2025-07-25

## 2025-07-30 ENCOUNTER — PRE-ADMISSION TESTING (OUTPATIENT)
Dept: PREADMISSION TESTING | Facility: HOSPITAL | Age: 60
End: 2025-07-30
Payer: COMMERCIAL

## 2025-07-30 ENCOUNTER — ANESTHESIA EVENT (OUTPATIENT)
Dept: GASTROENTEROLOGY | Facility: HOSPITAL | Age: 60
End: 2025-07-30

## 2025-07-30 RX ORDER — CHOLECALCIFEROL (VITAMIN D3) 25 MCG
25 TABLET ORAL DAILY
COMMUNITY

## 2025-07-30 SDOH — HEALTH STABILITY: MENTAL HEALTH: CURRENT SMOKER: 1

## 2025-07-30 ASSESSMENT — DUKE ACTIVITY SCORE INDEX (DASI)
CAN YOU DO YARD WORK LIKE RAKING LEAVES, WEEDING OR PUSHING A MOWER: YES
CAN YOU PARTICIPATE IN MODERATE RECREATIONAL ACTIVITIES LIKE GOLF, BOWLING, DANCING, DOUBLES TENNIS OR THROWING A BASEBALL OR FOOTBALL: YES
CAN YOU TAKE CARE OF YOURSELF (EAT, DRESS, BATHE, OR USE TOILET): YES
CAN YOU WALK INDOORS, SUCH AS AROUND YOUR HOUSE: YES
CAN YOU DO LIGHT WORK AROUND THE HOUSE LIKE DUSTING OR WASHING DISHES: YES
DASI METS SCORE: 9.9
CAN YOU RUN A SHORT DISTANCE: YES
CAN YOU CLIMB A FLIGHT OF STAIRS OR WALK UP A HILL: YES
CAN YOU WALK A BLOCK OR TWO ON LEVEL GROUND: YES
CAN YOU HAVE SEXUAL RELATIONS: YES
CAN YOU DO MODERATE WORK AROUND THE HOUSE LIKE VACUUMING, SWEEPING FLOORS OR CARRYING GROCERIES: YES
CAN YOU DO HEAVY WORK AROUND THE HOUSE LIKE SCRUBBING FLOORS OR LIFTING AND MOVING HEAVY FURNITURE: YES
TOTAL_SCORE: 58.2
CAN YOU PARTICIPATE IN STRENOUS SPORTS LIKE SWIMMING, SINGLES TENNIS, FOOTBALL, BASKETBALL, OR SKIING: YES

## 2025-07-30 NOTE — PREPROCEDURE INSTRUCTIONS
Medication List            Accurate as of July 30, 2025 11:35 AM. Always use your most recent med list.                DULoxetine 30 mg DR capsule  Commonly known as: Cymbalta  Take 1 capsule (30 mg) by mouth once daily. Do not crush or chew.  Medication Adjustments for Surgery: Take/Use as prescribed     polyethylene glycol [id number] -5.86 gram solution  Commonly known as: GoLYTELY  At 5pm, the night before your colonoscopy, take 8 ounces every 15 minutes until half the solution is gone (8 glasses). Refrigerate and repeat with the other half 5 hours prior to your colonoscopy.  Medication Adjustments for Surgery: Take/Use as prescribed     psyllium 3.4 gram packet  Commonly known as: Metamucil  Medication Adjustments for Surgery: Take/Use as prescribed     traZODone 50 mg tablet  Commonly known as: Desyrel  Take 0.5 tablets (25 mg) by mouth as needed at bedtime for sleep.  Medication Adjustments for Surgery: Take/Use as prescribed     Vitamin D3 25 mcg (1,000 units) tablet  Generic drug: cholecalciferol  Additional Medication Adjustments for Surgery: Take last dose 7 days before surgery                     -No food after midnight including chewing gum, candy, and mints.    -You can have clear liquids ( Water, Tea, Pop/Soda, Gatorade/Powerade, Black coffee) up to 3 hours before your procedure. NO JUICES, NO DAIRY, NO CREAMERS, NO HALF/HALF, NO NON-DAIRY CREAMERS/POWDERS.    -Please refrain from smoking THC, cigarettes, and/or vaping prior to your procedure for at least 24 hours.     - On day of procedure, have a  (if having anesthesia or sedation). Park in the back parking lot and come through the [facility]. Take elevator to second floor outpatient dept. Check in at the outpatient surgery window.    - Wear comfortable clothes, remove all jewelry and piercings.    -Glasses, contacts, and/or dentures may have to be removed. Bring a glass/contact case. A denture cup will be provided.    -Please shower or  bathe in the morning using an antibacterial soap. No makeup. Follow instructions if you are issued Hibiclens soap and/or mouth wash.     -No more than 2 visitors at your bedside.

## 2025-08-05 ENCOUNTER — HOSPITAL ENCOUNTER (OUTPATIENT)
Dept: RADIOLOGY | Facility: HOSPITAL | Age: 60
Discharge: HOME | End: 2025-08-05
Payer: COMMERCIAL

## 2025-08-05 DIAGNOSIS — R63.4 UNINTENTIONAL WEIGHT LOSS: ICD-10-CM

## 2025-08-05 PROCEDURE — 71271 CT THORAX LUNG CANCER SCR C-: CPT | Performed by: RADIOLOGY

## 2025-08-05 PROCEDURE — 71271 CT THORAX LUNG CANCER SCR C-: CPT

## 2025-08-07 ENCOUNTER — ANESTHESIA (OUTPATIENT)
Dept: GASTROENTEROLOGY | Facility: HOSPITAL | Age: 60
End: 2025-08-07

## 2025-08-07 ENCOUNTER — APPOINTMENT (OUTPATIENT)
Dept: GASTROENTEROLOGY | Facility: HOSPITAL | Age: 60
End: 2025-08-07
Payer: COMMERCIAL

## 2025-08-08 ENCOUNTER — APPOINTMENT (OUTPATIENT)
Dept: VASCULAR SURGERY | Facility: CLINIC | Age: 60
End: 2025-08-08
Payer: COMMERCIAL

## 2025-08-26 ENCOUNTER — OFFICE VISIT (OUTPATIENT)
Dept: PRIMARY CARE | Facility: CLINIC | Age: 60
End: 2025-08-26
Payer: COMMERCIAL

## 2025-08-26 VITALS
DIASTOLIC BLOOD PRESSURE: 67 MMHG | HEART RATE: 81 BPM | OXYGEN SATURATION: 96 % | RESPIRATION RATE: 18 BRPM | HEIGHT: 69 IN | WEIGHT: 148 LBS | BODY MASS INDEX: 21.92 KG/M2 | SYSTOLIC BLOOD PRESSURE: 103 MMHG

## 2025-08-26 DIAGNOSIS — D72.829 LEUKOCYTOSIS, UNSPECIFIED TYPE: ICD-10-CM

## 2025-08-26 DIAGNOSIS — K58.0 IRRITABLE BOWEL SYNDROME WITH DIARRHEA: ICD-10-CM

## 2025-08-26 DIAGNOSIS — G47.00 INSOMNIA, UNSPECIFIED TYPE: Primary | ICD-10-CM

## 2025-08-26 PROCEDURE — 3008F BODY MASS INDEX DOCD: CPT

## 2025-08-26 PROCEDURE — 99213 OFFICE O/P EST LOW 20 MIN: CPT

## 2025-08-26 RX ORDER — TRAZODONE HYDROCHLORIDE 50 MG/1
50 TABLET ORAL NIGHTLY PRN
Qty: 30 TABLET | Refills: 2 | Status: SHIPPED | OUTPATIENT
Start: 2025-08-26 | End: 2026-08-26

## 2025-08-26 ASSESSMENT — ENCOUNTER SYMPTOMS
VOMITING: 0
DIARRHEA: 1
FREQUENCY: 0
JOINT SWELLING: 0
SHORTNESS OF BREATH: 0
NAUSEA: 0
COUGH: 0
APPETITE CHANGE: 0
DIFFICULTY URINATING: 0
CONSTIPATION: 0
FEVER: 0
DIZZINESS: 0
CHILLS: 0
SLEEP DISTURBANCE: 0
ACTIVITY CHANGE: 0
DYSURIA: 0
LIGHT-HEADEDNESS: 0
ABDOMINAL PAIN: 1

## 2025-08-26 ASSESSMENT — PAIN SCALES - GENERAL: PAINLEVEL_OUTOF10: 0-NO PAIN

## 2025-08-28 ENCOUNTER — APPOINTMENT (OUTPATIENT)
Dept: GASTROENTEROLOGY | Facility: HOSPITAL | Age: 60
End: 2025-08-28
Payer: COMMERCIAL